# Patient Record
Sex: MALE | Race: WHITE | NOT HISPANIC OR LATINO | Employment: FULL TIME | URBAN - METROPOLITAN AREA
[De-identification: names, ages, dates, MRNs, and addresses within clinical notes are randomized per-mention and may not be internally consistent; named-entity substitution may affect disease eponyms.]

---

## 2017-06-20 ENCOUNTER — OFFICE VISIT (OUTPATIENT)
Dept: FAMILY MEDICINE | Facility: CLINIC | Age: 37
End: 2017-06-20
Payer: COMMERCIAL

## 2017-06-20 VITALS
SYSTOLIC BLOOD PRESSURE: 132 MMHG | TEMPERATURE: 98 F | DIASTOLIC BLOOD PRESSURE: 93 MMHG | HEIGHT: 72 IN | WEIGHT: 200.8 LBS | BODY MASS INDEX: 27.2 KG/M2

## 2017-06-20 DIAGNOSIS — M10.9 ACUTE GOUT OF LEFT ANKLE, UNSPECIFIED CAUSE: Primary | ICD-10-CM

## 2017-06-20 PROCEDURE — 99214 OFFICE O/P EST MOD 30 MIN: CPT | Performed by: FAMILY MEDICINE

## 2017-06-20 RX ORDER — INDOMETHACIN 25 MG/1
25 CAPSULE ORAL 2 TIMES DAILY WITH MEALS
Qty: 42 CAPSULE | Refills: 1 | Status: SHIPPED | OUTPATIENT
Start: 2017-06-20 | End: 2018-02-23

## 2017-06-20 NOTE — LETTER
[facility]  [address] [address]  [phone]        December 26, 2017    Tommie Lema  [address] [place] [address]              Dear Tommie Lema    This is to remind you that your fasting Lipid profile is due.    You may call our office at [phone] to schedule an appointment.    Please disregard this notice if you have already had your labs drawn or made an appointment.        Sincerely,        Eileen Etienne MD

## 2017-06-20 NOTE — PROGRESS NOTES
SUBJECTIVE:                                                    Tommie Lema is 37 year old male   Chief Complaint   Patient presents with     Arthritis     Gout  Duration: 5/15/17  Description   Location: Left ankle  Joint Swelling: YES  Redness: YES  Pain intensity:  7/10  Accompanying signs and symptoms: Radiation up to knee  History  Previous history of gout: YES   Trauma to the area: no   Precipitating factors:   Alcohol usage: socially  Diuretic use: no   Recent illness: no   Therapies tried and outcome: IBU. Colcrys no help        Problem list and histories reviewed & adjusted, as indicated.  Additional history: started in left [place]  Moved to [place].  Has had 1-2 attacks a year for several years.  Grandfather had gout.  Was drinking beer up north, thinks that was the cause of flare.    Patient Active Problem List   Diagnosis     FAMILY HISTORY OF CARDIOVASC DIS (ISCHEMIC)     CARDIOVASCULAR SCREENING; LDL GOAL LESS THAN 130     Gout     Past Surgical History:   Procedure Laterality Date     ABDOMEN SURGERY       GI SURGERY       SURGICAL HISTORY OF -       inguinal hernia - left     SURGICAL HISTORY OF -       wisdom teeth       Social History   Substance Use Topics     Smoking status: Never Smoker     Smokeless tobacco: Not on file     Alcohol use Yes      Comment: 1 drink/week     Family History   Problem Relation Age of Onset     Cardiovascular Father      C.A.D. Father      29     DIABETES Maternal Grandmother      DIABETES Maternal Grandfather      CEREBROVASCULAR DISEASE Paternal Grandfather      Asthma No family hx of      C.A.D. No family hx of      Hypertension No family hx of      Breast Cancer No family hx of      Cancer - colorectal No family hx of      Prostate Cancer No family hx of          Current Outpatient Prescriptions   Medication Sig Dispense Refill     indomethacin (INDOCIN) 25 MG capsule Take 1 capsule (25 mg) by mouth 2 times daily (with meals) 42  "capsule 1     COMPOUND (CMPD RX) - PHARMACY TO MIX COMPOUNDED MEDICATION G1 - DIC/BUP/COLC/IBU/INDO/XIMENA/TRIAM/PENT 5/1/0.05/3/2/1/0.1/3%Apply 1-2gm 2-3 times daily to affected area 120 g 1     ibuprofen (ADVIL,MOTRIN) 200 MG tablet Take 600 mg by mouth as needed. Indications: Mild to Moderate Pain       methylPREDNISolone (MEDROL DOSEPAK) 4 MG tablet Take 1 tablet (4 mg) by mouth See Admin Instructions (Patient not taking: Reported on 6/20/2017) 21 tablet 0     colchicine (COLCRYS) 0.6 MG tablet Take 2 tablets at the first sign of flare, take 1 additional tablet one hour later. (Patient not taking: Reported on 6/20/2017) 30 tablet 0     No Known Allergies  Recent Labs   Lab Test  03/11/16   1147  11/11/10   0425   LDL  Cannot estimate LDL when triglyceride exceeds 400 mg/dL   --    HDL  32*   --    TRIG  559*   --    CR   --   0.85   GFRESTIMATED   --   >90   GFRESTBLACK   --   >90   POTASSIUM   --   4.0      BP Readings from Last 3 Encounters:   06/20/17 (!) 132/93   08/26/16 (!) 130/92   03/11/16 148/90    Wt Readings from Last 3 Encounters:   06/20/17 200 lb 12.8 oz (91.1 kg)   08/26/16 206 lb (93.4 kg)   06/09/16 206 lb (93.4 kg)         ROS:  Constitutional, HEENT, cardiovascular, pulmonary, gi and gu systems are negative, except as otherwise noted.    OBJECTIVE:                                                    BP (!) 132/93  Temp 98  F (36.7  C) (Tympanic)  Ht 5' 11.5\" (1.816 m)  Wt 200 lb 12.8 oz (91.1 kg)  BMI 27.62 kg/m2  GENERAL APPEARANCE ADULT: Alert, no acute distress  MS: extremities normal, no peripheral edema  ankle exam: normal appearance, no swelling, tenderness at the lateral malleolus of left  SKIN: no suspicious lesions or rashes  NEURO: Alert, oriented, speech and mentation normal  PSYCH: mentation appears normal., affect and mood normal  Diagnostic Test Results:  none      ASSESSMENT/PLAN:                                                    1. Acute gout of left ankle, unspecified " cause  Dehydration probably ppt attack.  Discussed preventive treatment and acute treatment.  Will have indomethacin at home for next flare.  Will get lipids regulated and keep well hydrated.  - indomethacin (INDOCIN) 25 MG capsule; Take 1 capsule (25 mg) by mouth 2 times daily (with meals)  Dispense: 42 capsule; Refill: 1  - Lipid panel reflex to direct LDL; Future  - Basic metabolic panel; Future    Eileen Etienne MD  [facility]

## 2017-06-20 NOTE — MR AVS SNAPSHOT
After Visit Summary   6/20/2017    Tommie Lema    MRN: [id number]           Patient Information     Date Of Birth          1980        Visit Information        Provider Department      6/20/2017 6:40 AM Eileen Etienne MD [facility]        Today's Diagnoses     Acute gout of left ankle, unspecified cause    -  1      Care Instructions      Gout    Gout is an inflammation of a joint due to a build-up of gout crystals in the joint fluid. This occurs when there is an excess of uric acid (a normal waste product) in the body. Uric acid builds up in the body when the kidneys are unable to filter enough of it from the blood. This may occur with age. It is also associated with kidney disease. Gout occurs more often in persons with obesity, diabetes, hypertension, or high levels of fats in the blood. It may be run in families. Gout tends to come and go. A flare up of gout is called an attack. Drinking alcohol or eating certain foods (such as shellfish or foods with additives such as high-fructose corn syrup) may increase uric acid levels in the blood and cause a gout attack.  During a gout attack, the affected joint may become a hot, red, swollen and painful. If you have had one attack of gout, you are likely to have another. An attack of gout can be treated with medicine. If these attacks become frequent, a daily medicine may be prescribed to help the kidneys remove uric acid from the body.  Home care  During a gout attack:    Rest painful joints. If gout affects the joints of your foot or leg, you may want to use crutches for the first few days to keep from bearing weight on the affected joint.    When sitting or lying down, raise the painful joint to a level higher than your heart.    Apply an ice pack (ice cubes in a plastic bag wrapped in a thin towel) over the injured area for 20 minutes every 1-2 hours the first day for pain relief. Continue this 3-4 times a day for swelling  and pain.    Avoid alcohol and foods listed below (see Preventing attacks) during a gout attack. Drink extra fluid to help flush the uric acid through your kidneys.    If you were prescribed a medication to treat gout, take it as your healthcare provider has instructed. Don't skip doses.    Take anti-inflammatory medicine as directed.     If pain medicines have been prescribed, take them exactly as directed.    Preventing attacks    Minimize or avoid alcohol use. Excess alcohol intake can cause a gout attack.    Limit these foods and beverages:    Organ meats, such as kidneys and liver    Certain seafoods (anchovies, sardines, shrimp, scallops, herring, mackerel)    Wild game, meat extracts and meat gravies    Foods and beverages sweetened with high-fructose corn syrup, such as sodas    Eat a healthy diet including low-fat and nonfat dairy, whole grains, and vegetables.    If you are overweight, talk to your healthcare provider about a weight reduction plan. Avoid fasting or extreme low calorie diets (less than 900 calories per day). This will increase uric acid levels in the body.    If you have diabetes or high blood pressure, work with your doctor to manage these conditions.    Protect the joint from injury. Trauma can trigger a gout attack.  Follow-up care  Follow up with your healthcare provider or as advised.   When to seek medical advice  Call your healthcare provider if you have any of the following:    Fever over 100.4 F (38. C) with worsening joint pain    Increasing redness around the joint    Pain developing in another joint    Repeated vomiting, abdominal pain, or blood in the vomit or stool (black or red color)  Date Last Reviewed: 5/14/2015 2000-2017 [employer]. [address] [place] [address]. All rights reserved. This information is not intended as a substitute for professional medical care. Always follow your healthcare professional's instructions.        Treating Gout  Attacks     Raising the joint above the level of your heart can help reduce gout symptoms.     Gout is a disease that affects the joints. It is caused by excess uric acid in your blood stream that may lead to crystals forming in your joints. Left untreated, it can lead to painful foot and joint deformities and even kidney problems. But, by treating gout early, you can relieve pain and help prevent future problems. Gout can usually be treated with medication and proper diet. In severe cases, surgery may be needed.  Gout attacks are painful and often happen more than once. Taking medications may reduce pain and prevent attacks in the future. There are also some things you can do at home to relieve symptoms.  Medications for gout  Your healthcare provider may prescribe a daily medication to reduce levels of uric acid. Reducing your uric acid levels may help prevent gout attacks. Allopurinol is one commonly used medication taken daily to reduce uric acid levels. Other medications can help relieve pain and swelling during an acute attack. Medicines such as NSAIDs (nonsteroidal anti-inflammatory medicines), steroids, and colchicine may be prescribed for intermittent use to relieve an acute gout attack. Be sure to take your medication as directed.  What you can do  Below are some things you can do at home to relieve gout symptoms. Your healthcare provider may have other tips.    Rest the painful joint as much as you can.    Raise the painful joint so it is at a level higher than your heart.    Use ice for 10 minutes every 1-2 hours as possible.  How can I prevent gout?  With a little effort, you may be able to prevent gout attacks in the future. Here are some things you can do:    Avoid foods high in purines    Certain meats (red meat, processed meat, turkey)    Organ meats (kidney, liver, sweetbread)    Shellfish (lobster, crab, shrimp, scallop, mussel)    Certain fish (anchovy, sardine, herring, mackerel)    Take any  medications prescribed by your healthcare provider.    Lose weight if you need to.    Reduce high fructose corn syrup in meals and drinks.    Reduce or eliminate consumption of alcohol, particularly beer, but also red wine and spirits.    Control blood pressure, diabetes, and cholesterol.    Drink plenty of water to help flush uric acid from your body.  Date Last Reviewed: 2/1/2016 2000-2017 [employer]. [address] [place] [address], [place] [address]. All rights reserved. This information is not intended as a substitute for professional medical care. Always follow your healthcare professional's instructions.        Gout Diet  Gout is a painful condition caused by an excess of uric acid, a waste product made by the body. Uric acid forms crystals that collect in the joints. The immune response to these crystals brings on symptoms of joint pain and swelling. This is called a gout attack. Often, medications and diet changes are combined to manage gout. Below are some guidelines for changing your diet to help you manage gout and prevent attacks. Your health care provider will help you determine the best eating plan for you.     Eating to manage gout  Weight loss for those who are overweight may help reduce gout attacks.  Eat less of these foods  Eating too many foods containing purines may raise the levels of uric acid in your body. This raises your risk for a gout attack. Try to limit these foods and drinks:    Alcohol, such as beer and red wine. You may be told to avoid alcohol completely.    Soft drinks that contain sugar or high fructose corn syrup    Certain fish, including anchovies, sardines, fish eggs, and herring    Shellfish    Certain meats, such as red meat, hot dogs, luncheon meats, and turkey    Organ meats, such as liver, kidneys, and sweetbreads    Legumes, such as dried beans and peas    Other high fat foods such as gravy, whole milk, and high fat cheeses    Vegetables such as asparagus,  cauliflower, spinach, and mushrooms used to be thought to contribute to an increased risk for a gout attack, but recent studies show that high purine vegetables don't increase the risk for a gout attack.  Eat more of these foods  Other foods may be helpful for people with gout. Add some of these foods to your diet:    Cherries contain chemicals that may lower uric acid.    Omega fatty acids. These are found in some fatty fish such as salmon, certain oils (flax, olive, or nut), and nuts themselves. Omega fatty acids may help prevent inflammation due to gout.    Dairy products that are low-fat or fat-free, such as cheese and yogurt    Complex carbohydrate foods, including whole grains, brown rice, oats, and beans    Coffee, in moderation    Water, approximately 64 ounces per day  Follow-up care  Follow up with your healthcare provider as advised.  When to seek medical advice  Call your healthcare provider right away if any of these occur:    Return of gout symptoms, usually at night:    Severe pain, swelling, and heat in a joint, especially the base of the big toe    Affected joint is hard to move    Skin of the affected joint is purple or red    Fever of 100.4 F (38 C) or higher    Pain that doesn't get better even with prescribed medicine   Date Last Reviewed: 1/12/2016 2000-2017 [employer]. [address] [place] [address], [place] [address]. All rights reserved. This information is not intended as a substitute for professional medical care. Always follow your healthcare professional's instructions.        Eating to Prevent Gout  Gout is a painful form of arthritis caused by an excess of uric acid. This is a waste product made by the body. It builds up in the body and forms crystals that collect in the joints, bringing on a gout attack. Alcohol and certain foods can trigger a gout attack. Below are some guidelines for changing your diet to help you manage gout. Your healthcare provider can work with you to  determine the best eating plan for you. Know that diet is only one part of managing gout. Take your medicines as prescribed and follow the other guidelines your healthcare provider has given you.  Foods to limit  Eating too many foods containing purines may increase the levels of uric acid in your body and increase your risk for a gout attack. It may be best to limit these high-purine foods:    Alcohol (beer, red wine). You may be told to avoid alcohol completely.    Certain fish (anchovies, sardines, fish roes, herring, tuna, mussels, codfish, scallops, trout, and catrachito)    Certain meats (red meat, processed meat, priest, turkey, wild game, and goose)    Sauces and gravies made with meat    Organ meats (such as liver, kidneys, sweetbreads, and tripe)    Legumes (such as dried beans, peas)    Mushrooms, spinach, asparagus, and cauliflower    Yeast and yeast extract supplements  Foods to try  Some foods may be helpful for people with gout. You may want to try adding some of the following foods to your diet:    Dark berries: These include blueberries, blackberries, and cherries. These berries contain chemicals that may lower uric acid.    Tofu: Tofu, which is made from soy, is a good source of protein. Studies have shown that it may be a better choice than meat for people with gout.    Omega fatty acids: These acids are found in fatty fish (such as salmon), certain oils (such as flax, olive, or nut oils), or nuts. They may help prevent inflammation due to gout.  The following guidelines are recommended by the American Medical Association for people with gout. Your diet should be:    High in fiber, whole grains, fruits, and vegetables.    Low in protein (15% of calories should come from protein. Choose lean sources such as soy, lean meats, and poultry).    Low in fat (no more than 30% of calories should come from fat, with only 10% coming from animal fat).   Date Last Reviewed: 6/17/2015 2000-2017 [employer]  [employer]. [address] [place] [address]. All rights reserved. This information is not intended as a substitute for professional medical care. Always follow your healthcare professional's instructions.                Follow-ups after your visit        Future tests that were ordered for you today     Open Future Orders        Priority Expected Expires Ordered    Lipid panel reflex to direct LDL Routine 2017    Basic metabolic panel Routine 2017            Who to contact     If you have questions or need follow up information about today's clinic visit or your schedule please contact [facility] directly at [phone].  Normal or non-critical lab and imaging results will be communicated to you by Viroolhart, letter or phone within 4 business days after the clinic has received the results. If you do not hear from us within 7 days, please contact the clinic through Viroolhart or phone. If you have a critical or abnormal lab result, we will notify you by phone as soon as possible.  Submit refill requests through [employer] or call your pharmacy and they will forward the refill request to us. Please allow 3 business days for your refill to be completed.          Additional Information About Your Visit        Viroolhart Information     [employer] lets you send messages to your doctor, view your test results, renew your prescriptions, schedule appointments and more. To sign up, go to [email or web address] . Click on \"Log in\" on the left side of the screen, which will take you to the Welcome page. Then click on \"Sign up Now\" on the right side of the page.     You will be asked to enter the access code listed below, as well as some personal information. Please follow the directions to create your username and password.     Your access code is: [email or web address]  Expires: 2017  7:24 AM     Your access code will  in 90 days. If you need help or a new " "code, please call your [place] clinic or [phone].        Care EveryWhere ID     This is your Care EveryWhere ID. This could be used by other organizations to access your [place] medical records  [id number]        Your Vitals Were     Temperature Height BMI (Body Mass Index)             98  F (36.7  C) (Tympanic) 5' 11.5\" (1.816 m) 27.62 kg/m2          Blood Pressure from Last 3 Encounters:   06/20/17 (!) 132/93   08/26/16 (!) 130/92   03/11/16 148/90    Weight from Last 3 Encounters:   06/20/17 200 lb 12.8 oz (91.1 kg)   08/26/16 206 lb (93.4 kg)   06/09/16 206 lb (93.4 kg)                 Today's Medication Changes          These changes are accurate as of: 6/20/17  7:38 AM.  If you have any questions, ask your nurse or doctor.               Start taking these medicines.        Dose/Directions    indomethacin 25 MG capsule   Commonly known as:  INDOCIN   Used for:  Acute gout of left ankle, unspecified cause   Started by:  Eileen Etienne MD        Dose:  25 mg   Take 1 capsule (25 mg) by mouth 2 times daily (with meals)   Quantity:  42 capsule   Refills:  1            Where to get your medicines      These medications were sent to [place] [address] [place] [address]  [address] [facility] [address]     Phone:  [phone]     indomethacin 25 MG capsule                Primary Care Provider    Provider Unknown       No address on file        Thank you!     Thank you for choosing [facility]  for your care. Our goal is always to provide you with excellent care. Hearing back from our patients is one way we can continue to improve our services. Please take a few minutes to complete the written survey that you may receive in the mail after your visit with us. Thank you!             Your Updated Medication List - Protect others around you: Learn how to safely use, store and throw away your medicines at www.disposemymeds.org.          This list is " accurate as of: 6/20/17  7:38 AM.  Always use your most recent med list.                   Brand Name Dispense Instructions for use    colchicine 0.6 MG tablet    COLCRYS    30 tablet    Take 2 tablets at the first sign of flare, take 1 additional tablet one hour later.       COMPOUND - PHARMACY TO MIX COMPOUNDED MEDICATION    CMPD RX    120 g    G1 - DIC/BUP/COLC/IBU/INDO/XIMENA/TRIAM/PENT 5/1/0.05/3/2/1/0.1/3%Apply 1-2gm 2-3 times daily to affected area       ibuprofen 200 MG tablet    ADVIL/MOTRIN     Take 600 mg by mouth as needed. Indications: Mild to Moderate Pain       indomethacin 25 MG capsule    INDOCIN    42 capsule    Take 1 capsule (25 mg) by mouth 2 times daily (with meals)       methylPREDNISolone 4 MG tablet    MEDROL DOSEPAK    21 tablet    Take 1 tablet (4 mg) by mouth See Admin Instructions

## 2017-06-20 NOTE — NURSING NOTE
"Initial BP (!) 132/93  Temp 98  F (36.7  C) (Tympanic)  Ht 5' 11.5\" (1.816 m)  Wt 200 lb 12.8 oz (91.1 kg)  BMI 27.62 kg/m2 Estimated body mass index is 27.62 kg/(m^2) as calculated from the following:    Height as of this encounter: 5' 11.5\" (1.816 m).    Weight as of this encounter: 200 lb 12.8 oz (91.1 kg). .      "

## 2017-06-20 NOTE — PATIENT INSTRUCTIONS
Gout    Gout is an inflammation of a joint due to a build-up of gout crystals in the joint fluid. This occurs when there is an excess of uric acid (a normal waste product) in the body. Uric acid builds up in the body when the kidneys are unable to filter enough of it from the blood. This may occur with age. It is also associated with kidney disease. Gout occurs more often in persons with obesity, diabetes, hypertension, or high levels of fats in the blood. It may be run in families. Gout tends to come and go. A flare up of gout is called an attack. Drinking alcohol or eating certain foods (such as shellfish or foods with additives such as high-fructose corn syrup) may increase uric acid levels in the blood and cause a gout attack.  During a gout attack, the affected joint may become a hot, red, swollen and painful. If you have had one attack of gout, you are likely to have another. An attack of gout can be treated with medicine. If these attacks become frequent, a daily medicine may be prescribed to help the kidneys remove uric acid from the body.  Home care  During a gout attack:    Rest painful joints. If gout affects the joints of your foot or leg, you may want to use crutches for the first few days to keep from bearing weight on the affected joint.    When sitting or lying down, raise the painful joint to a level higher than your heart.    Apply an ice pack (ice cubes in a plastic bag wrapped in a thin towel) over the injured area for 20 minutes every 1-2 hours the first day for pain relief. Continue this 3-4 times a day for swelling and pain.    Avoid alcohol and foods listed below (see Preventing attacks) during a gout attack. Drink extra fluid to help flush the uric acid through your kidneys.    If you were prescribed a medication to treat gout, take it as your healthcare provider has instructed. Don't skip doses.    Take anti-inflammatory medicine as directed.     If pain medicines have been prescribed,  take them exactly as directed.    Preventing attacks    Minimize or avoid alcohol use. Excess alcohol intake can cause a gout attack.    Limit these foods and beverages:    Organ meats, such as kidneys and liver    Certain seafoods (anchovies, sardines, shrimp, scallops, herring, mackerel)    Wild game, meat extracts and meat gravies    Foods and beverages sweetened with high-fructose corn syrup, such as sodas    Eat a healthy diet including low-fat and nonfat dairy, whole grains, and vegetables.    If you are overweight, talk to your healthcare provider about a weight reduction plan. Avoid fasting or extreme low calorie diets (less than 900 calories per day). This will increase uric acid levels in the body.    If you have diabetes or high blood pressure, work with your doctor to manage these conditions.    Protect the joint from injury. Trauma can trigger a gout attack.  Follow-up care  Follow up with your healthcare provider or as advised.   When to seek medical advice  Call your healthcare provider if you have any of the following:    Fever over 100.4 F (38. C) with worsening joint pain    Increasing redness around the joint    Pain developing in another joint    Repeated vomiting, abdominal pain, or blood in the vomit or stool (black or red color)  Date Last Reviewed: 5/14/2015 2000-2017 [employer]. [address] [place] [address], [place] [address]. All rights reserved. This information is not intended as a substitute for professional medical care. Always follow your healthcare professional's instructions.        Treating Gout Attacks     Raising the joint above the level of your heart can help reduce gout symptoms.     Gout is a disease that affects the joints. It is caused by excess uric acid in your blood stream that may lead to crystals forming in your joints. Left untreated, it can lead to painful foot and joint deformities and even kidney problems. But, by treating gout early, you can relieve  pain and help prevent future problems. Gout can usually be treated with medication and proper diet. In severe cases, surgery may be needed.  Gout attacks are painful and often happen more than once. Taking medications may reduce pain and prevent attacks in the future. There are also some things you can do at home to relieve symptoms.  Medications for gout  Your healthcare provider may prescribe a daily medication to reduce levels of uric acid. Reducing your uric acid levels may help prevent gout attacks. Allopurinol is one commonly used medication taken daily to reduce uric acid levels. Other medications can help relieve pain and swelling during an acute attack. Medicines such as NSAIDs (nonsteroidal anti-inflammatory medicines), steroids, and colchicine may be prescribed for intermittent use to relieve an acute gout attack. Be sure to take your medication as directed.  What you can do  Below are some things you can do at home to relieve gout symptoms. Your healthcare provider may have other tips.    Rest the painful joint as much as you can.    Raise the painful joint so it is at a level higher than your heart.    Use ice for 10 minutes every 1-2 hours as possible.  How can I prevent gout?  With a little effort, you may be able to prevent gout attacks in the future. Here are some things you can do:    Avoid foods high in purines    Certain meats (red meat, processed meat, turkey)    Organ meats (kidney, liver, sweetbread)    Shellfish (lobster, crab, shrimp, scallop, mussel)    Certain fish (anchovy, sardine, herring, mackerel)    Take any medications prescribed by your healthcare provider.    Lose weight if you need to.    Reduce high fructose corn syrup in meals and drinks.    Reduce or eliminate consumption of alcohol, particularly beer, but also red wine and spirits.    Control blood pressure, diabetes, and cholesterol.    Drink plenty of water to help flush uric acid from your body.  Date Last Reviewed:  2/1/2016 2000-2017 [employer]. [address] [place] [address], [place] [address]. All rights reserved. This information is not intended as a substitute for professional medical care. Always follow your healthcare professional's instructions.        Gout Diet  Gout is a painful condition caused by an excess of uric acid, a waste product made by the body. Uric acid forms crystals that collect in the joints. The immune response to these crystals brings on symptoms of joint pain and swelling. This is called a gout attack. Often, medications and diet changes are combined to manage gout. Below are some guidelines for changing your diet to help you manage gout and prevent attacks. Your health care provider will help you determine the best eating plan for you.     Eating to manage gout  Weight loss for those who are overweight may help reduce gout attacks.  Eat less of these foods  Eating too many foods containing purines may raise the levels of uric acid in your body. This raises your risk for a gout attack. Try to limit these foods and drinks:    Alcohol, such as beer and red wine. You may be told to avoid alcohol completely.    Soft drinks that contain sugar or high fructose corn syrup    Certain fish, including anchovies, sardines, fish eggs, and herring    Shellfish    Certain meats, such as red meat, hot dogs, luncheon meats, and turkey    Organ meats, such as liver, kidneys, and sweetbreads    Legumes, such as dried beans and peas    Other high fat foods such as gravy, whole milk, and high fat cheeses    Vegetables such as asparagus, cauliflower, spinach, and mushrooms used to be thought to contribute to an increased risk for a gout attack, but recent studies show that high purine vegetables don't increase the risk for a gout attack.  Eat more of these foods  Other foods may be helpful for people with gout. Add some of these foods to your diet:    Cherries contain chemicals that may lower uric  acid.    Omega fatty acids. These are found in some fatty fish such as salmon, certain oils (flax, olive, or nut), and nuts themselves. Omega fatty acids may help prevent inflammation due to gout.    Dairy products that are low-fat or fat-free, such as cheese and yogurt    Complex carbohydrate foods, including whole grains, brown rice, oats, and beans    Coffee, in moderation    Water, approximately 64 ounces per day  Follow-up care  Follow up with your healthcare provider as advised.  When to seek medical advice  Call your healthcare provider right away if any of these occur:    Return of gout symptoms, usually at night:    Severe pain, swelling, and heat in a joint, especially the base of the big toe    Affected joint is hard to move    Skin of the affected joint is purple or red    Fever of 100.4 F (38 C) or higher    Pain that doesn't get better even with prescribed medicine   Date Last Reviewed: 1/12/2016 2000-2017 [employer]. [address] [place] [address]. All rights reserved. This information is not intended as a substitute for professional medical care. Always follow your healthcare professional's instructions.        Eating to Prevent Gout  Gout is a painful form of arthritis caused by an excess of uric acid. This is a waste product made by the body. It builds up in the body and forms crystals that collect in the joints, bringing on a gout attack. Alcohol and certain foods can trigger a gout attack. Below are some guidelines for changing your diet to help you manage gout. Your healthcare provider can work with you to determine the best eating plan for you. Know that diet is only one part of managing gout. Take your medicines as prescribed and follow the other guidelines your healthcare provider has given you.  Foods to limit  Eating too many foods containing purines may increase the levels of uric acid in your body and increase your risk for a gout attack. It may be best to  limit these high-purine foods:    Alcohol (beer, red wine). You may be told to avoid alcohol completely.    Certain fish (anchovies, sardines, fish roes, herring, tuna, mussels, codfish, scallops, trout, and catrachito)    Certain meats (red meat, processed meat, priest, turkey, wild game, and goose)    Sauces and gravies made with meat    Organ meats (such as liver, kidneys, sweetbreads, and tripe)    Legumes (such as dried beans, peas)    Mushrooms, spinach, asparagus, and cauliflower    Yeast and yeast extract supplements  Foods to try  Some foods may be helpful for people with gout. You may want to try adding some of the following foods to your diet:    Dark berries: These include blueberries, blackberries, and cherries. These berries contain chemicals that may lower uric acid.    Tofu: Tofu, which is made from soy, is a good source of protein. Studies have shown that it may be a better choice than meat for people with gout.    Omega fatty acids: These acids are found in fatty fish (such as salmon), certain oils (such as flax, olive, or nut oils), or nuts. They may help prevent inflammation due to gout.  The following guidelines are recommended by the American Medical Association for people with gout. Your diet should be:    High in fiber, whole grains, fruits, and vegetables.    Low in protein (15% of calories should come from protein. Choose lean sources such as soy, lean meats, and poultry).    Low in fat (no more than 30% of calories should come from fat, with only 10% coming from animal fat).   Date Last Reviewed: 6/17/2015 2000-2017 [employer]. [address] [place] [address], [place] [address]. All rights reserved. This information is not intended as a substitute for professional medical care. Always follow your healthcare professional's instructions.

## 2017-12-04 ENCOUNTER — OFFICE VISIT (OUTPATIENT)
Dept: FAMILY MEDICINE | Facility: CLINIC | Age: 37
End: 2017-12-04
Payer: COMMERCIAL

## 2017-12-04 VITALS
HEART RATE: 89 BPM | SYSTOLIC BLOOD PRESSURE: 130 MMHG | DIASTOLIC BLOOD PRESSURE: 87 MMHG | BODY MASS INDEX: 26.09 KG/M2 | WEIGHT: 192.6 LBS | HEIGHT: 72 IN | TEMPERATURE: 97.4 F

## 2017-12-04 DIAGNOSIS — Z30.9 ENCOUNTER FOR CONTRACEPTIVE MANAGEMENT, UNSPECIFIED TYPE: Primary | ICD-10-CM

## 2017-12-04 PROCEDURE — 99213 OFFICE O/P EST LOW 20 MIN: CPT | Performed by: FAMILY MEDICINE

## 2017-12-04 NOTE — PROGRESS NOTES
SUBJECTIVE:   Tommie Lema is a 37 year old male who presents to clinic today for the following health issues:    Patient is here today to discuss vasectomy      Current Outpatient Prescriptions:      indomethacin (INDOCIN) 25 MG capsule, Take 1 capsule (25 mg) by mouth 2 times daily (with meals) (Patient not taking: Reported on 12/4/2017), Disp: 42 capsule, Rfl: 1     methylPREDNISolone (MEDROL DOSEPAK) 4 MG tablet, Take 1 tablet (4 mg) by mouth See Admin Instructions (Patient not taking: Reported on 6/20/2017), Disp: 21 tablet, Rfl: 0     ibuprofen (ADVIL,MOTRIN) 200 MG tablet, Take 600 mg by mouth as needed. Indications: Mild to Moderate Pain, Disp: , Rfl:         SUBJECTIVE:  This 37 year old male is in for a vasectomy consultation.  He and his partner have decided they don't want to have any more children. They have decided that he will have the sterilization procedure instead of her.  Patient was given information to read prior to the consultation.      He has three healthy children, aged 11, 9 and 6. He has no allergies to Lidocaine or iodine. No bleeding disorders. He had a hernia repaired on the left side. This healed well.     We talked about the risks and benefits of the vasectomy; risks being that of potential for bleeding, infection, postoperative discomfort immediately which can last for a number of weeks afterwards, also the development of spermatoceles or sperm granulomas, epididymal cysts and the possibility of failure of the procedure producing failed attempt at sterility.     Also mentioned to the patient that there is some literature out there that suggests men who have vasectomies are at increased risk for prostate cancer; however, this literature is inconclusive and controversial.  It is recommended that men who have vasectomies should have annual prostate exams through the rectum yearly after age 40 and also may benefit from a screening prostatic specific antigen test after age 40.  We  "also discussed signs and symptoms of prostatic enlargement or prostatic problems.     I went through the procedure with the patient, how it is done, a midline incision in the scrotum measuring approximately 1/2 inch in length.  The vas deferens is brought up through this incision, dissected free and a small portion, maybe 0.5 cm. in length is removed.  Each end is tied with an absorbable suture, cauterized and then the proximal or distal end is buried away from the other.  Patient had no questions when it came to the procedure itself.  I did show him pictures and diagrams of the procedure.  I showed him where a potential spermatocele or sperm granuloma can occur.      Again, the patient had no further questions for me.    OBJECTIVE:Blood pressure 130/87, pulse 89, temperature 97.4  F (36.3  C), temperature source Tympanic, height 5' 11.5\" (1.816 m), weight 192 lb 9.6 oz (87.4 kg).      On exam, this is a well-developed, well-nourished white male.      Exam reveals a normal circumcised penis, no lesions.  Testes are descended bilaterally.  Exam was limited to the genitalia.  Both vas deferens were easily identified. Varicocele noted on the right.  No other abnormalities were noted.      ASSESSMENT:  Undesired fertility in an adult male, requesting vasectomy.    PLAN:  He will schedule a vasectomy at his convenience for either the last appointment of the morning or on a Thursday or Friday afternoon.  He is aware that he will need to take the entire weekend off and have essentially bedrest for two days with ice packs every two hours and ibuprofen for discomfort.  I gave him a prescription for ibuprofen 800 mg. to take every six to eight hours with food after the procedure.  He will take one tablet half an hour before the procedure along with Valium 10 mg.    If he has any further questions, he will contact me prior to the procedure or ask me on the day of the procedure.  He also is aware that he will need to bring a " specimen after 10-12 ejaculations to make sure that he has cleared the storage vesicles of any residual sperm and to make sure that he is sterile.       Steven Escobedo

## 2017-12-04 NOTE — NURSING NOTE
"Chief Complaint   Patient presents with     Consult     Vasectomy consult       Initial There were no vitals taken for this visit. Estimated body mass index is 27.62 kg/(m^2) as calculated from the following:    Height as of 6/20/17: 5' 11.5\" (1.816 m).    Weight as of 6/20/17: 200 lb 12.8 oz (91.1 kg).  Medication Reconciliation: complete   Netta Mayer CMA      "

## 2017-12-04 NOTE — MR AVS SNAPSHOT
"              After Visit Summary   12/4/2017    Tommie Lema    MRN: [id number]           Patient Information     Date Of Birth          1980        Visit Information        Provider Department      12/4/2017 1:40 PM Steven Escobedo MD [facility]        Today's Diagnoses     Encounter for contraceptive management, unspecified type    -  1       Follow-ups after your visit        Your next 10 appointments already scheduled     Dec 07, 2017  3:00 PM CST   [facility] with [facility]edo [facility]    [address] [facility] [id number] [phone]           Bring a current list of meds and any records pertaining to this visit. For Physicals, please bring immunization records and any forms needing to be filled out. Please arrive 10 minutes early to complete paperwork.              Who to contact     If you have questions or need follow up information about today's clinic visit or your schedule please contact [facility] directly at [phone].  Normal or non-critical lab and imaging results will be communicated to you by [employer], letter or phone within 4 business days after the clinic has received the results. If you do not hear from us within 7 days, please contact the clinic through [employer] or phone. If you have a critical or abnormal lab result, we will notify you by phone as soon as possible.  Submit refill requests through [employer] or call your pharmacy and they will forward the refill request to us. Please allow 3 business days for your refill to be completed.          Additional Information About Your Visit        [employer] Information     [employer] lets you send messages to your doctor, view your test results, renew your prescriptions, schedule appointments and more. To sign up, go to [email or web address] . Click on \"Log in\" on the left side of the screen, which will take you to the Welcome page. Then " "click on \"Sign up Now\" on the right side of the page.     You will be asked to enter the access code listed below, as well as some personal information. Please follow the directions to create your username and password.     Your access code is: [email or web address]  Expires: 3/4/2018  2:24 PM     Your access code will  in 90 days. If you need help or a new code, please call your [place] clinic or [phone].        Care EveryWhere ID     This is your Care EveryWhere ID. This could be used by other organizations to access your [place] medical records  [id number]        Your Vitals Were     Pulse Temperature Height BMI (Body Mass Index)          89 97.4  F (36.3  C) (Tympanic) 5' 11.5\" (1.816 m) 26.49 kg/m2         Blood Pressure from Last 3 Encounters:   17 130/87   17 (!) 132/93   16 (!) 130/92    Weight from Last 3 Encounters:   17 192 lb 9.6 oz (87.4 kg)   17 200 lb 12.8 oz (91.1 kg)   16 206 lb (93.4 kg)              Today, you had the following     No orders found for display         Today's Medication Changes          These changes are accurate as of: 17  2:24 PM.  If you have any questions, ask your nurse or doctor.               Stop taking these medicines if you haven't already. Please contact your care team if you have questions.     colchicine 0.6 MG tablet   Commonly known as:  COLCRYS   Stopped by:  Steven Escobedo MD           COMPOUNDED NON-CONTROLLED SUBSTANCE - PHARMACY TO MIX COMPOUNDED MEDICATION   Commonly known as:  CMPD RX   Stopped by:  Steven Escobedo MD                    Primary Care Provider Office Phone # Fax #    Paddy Salcido [phone] [address] [facility] [address]        Equal Access to Services     PEDRO GARZA AH: Viola Lambert, karely luqadaha, qaybta kaalsunil hammonds. [facility] [phone].    ATENCIÓN: Si shadi malagon a joaquin " disposición servicios gratuitos de asistencia lingüística. Dhruv bhandari [phone].    We comply with applicable federal civil rights laws and [place] laws. We do not discriminate on the basis of race, color, national origin, age, disability, sex, sexual orientation, or gender identity.            Thank you!     Thank you for choosing [facility]  for your care. Our goal is always to provide you with excellent care. Hearing back from our patients is one way we can continue to improve our services. Please take a few minutes to complete the written survey that you may receive in the mail after your visit with us. Thank you!             Your Updated Medication List - Protect others around you: Learn how to safely use, store and throw away your medicines at www.disposemymeds.org.          This list is accurate as of: 12/4/17  2:24 PM.  Always use your most recent med list.                   Brand Name Dispense Instructions for use Diagnosis    ibuprofen 200 MG tablet    ADVIL/MOTRIN     Take 600 mg by mouth as needed. Indications: Mild to Moderate Pain        indomethacin 25 MG capsule    INDOCIN    42 capsule    Take 1 capsule (25 mg) by mouth 2 times daily (with meals)    Acute gout of left ankle, unspecified cause       methylPREDNISolone 4 MG tablet    MEDROL DOSEPAK    21 tablet    Take 1 tablet (4 mg) by mouth See Admin Instructions    Bilateral foot pain, Acute idiopathic gout of right foot

## 2017-12-07 ENCOUNTER — OFFICE VISIT (OUTPATIENT)
Dept: FAMILY MEDICINE | Facility: CLINIC | Age: 37
End: 2017-12-07
Payer: COMMERCIAL

## 2017-12-07 VITALS
HEART RATE: 84 BPM | BODY MASS INDEX: 25.73 KG/M2 | TEMPERATURE: 98.1 F | DIASTOLIC BLOOD PRESSURE: 85 MMHG | WEIGHT: 190 LBS | HEIGHT: 72 IN | SYSTOLIC BLOOD PRESSURE: 139 MMHG

## 2017-12-07 DIAGNOSIS — Z30.2 ENCOUNTER FOR STERILIZATION: Primary | ICD-10-CM

## 2017-12-07 PROCEDURE — 55250 REMOVAL OF SPERM DUCT(S): CPT | Performed by: FAMILY MEDICINE

## 2017-12-07 PROCEDURE — 88302 TISSUE EXAM BY PATHOLOGIST: CPT | Performed by: FAMILY MEDICINE

## 2017-12-07 NOTE — PROGRESS NOTES
SUBJECTIVE:   Tommie Lema is a 37 year old male who presents to clinic today for the following health issues:      VASECTOMY:  Here for the procedure Vasectomy.  Consult was done on 12-4-17.    SUBJECTIVE:  Informed consent was obtained.  An opportunity for questions was given, these were answered to his satisfaction.  Vasectomy literature was reviewed at his consult and post vas instruction sheets have also been reviewed.  He knows that it is meant to be a permanent procedure and that he needs to bring in a post vas specimen in eight to twelve weeks after twelve to fifteen ejaculations and have a negative semen analysis with no sperm seen before he can be considered sterile.  He also knows that he has to bring in a sample in one year. He wishes to proceed.  He did shave the night before.     SEDATION:  none    PROCEDURE:  The penis was taped up on to the abdomen.  The shave was adequate. The scrotum was prepped with Betadine and draped in a sterile fashion.  Attention was turned to the right vas; this was palpated and the area infiltrated with 1% Lidocaine plain.  A 1 cm. incision was made in the skin and blunt dissection carried out through the subcutaneous tissue.  The vas was grasped with the vas clamp and brought to the surface.  The monique vas tissue was then dissected free.  The vas was then doubly clamped and a 3 mm. section excised.  This was sent to Pathology for confirmation.  The ends were then cauterized and the proximal end buried with a pursestring suture using 4-0 Vicryl.  Any bleeders were controlled with the pursestring suture and/or cautery. The ends were inspected and hemostasis was deemed to be adequate.  The two ends were then delivered back into the scrotum and the wound was dressed.  Attention was then turned to the left vas and a similar procedure was carried out.    Specimens sent of the right and left vas.     COMPLICATIONS:  none    PLAN:  He is going to take it easy over the next  couple of days.  He will follow the instructions on his post vas instruction sheet.    MEDICATIONS:  none    MD Steven PATHAK

## 2017-12-07 NOTE — NURSING NOTE
"Chief Complaint   Patient presents with     Vasectomy     Here for the procedure Vasectomy.  Consult was done on 12-4-17.       Initial /85  Pulse 84  Temp 98.1  F (36.7  C) (Tympanic)  Ht 5' 11.5\" (1.816 m)  Wt 190 lb (86.2 kg)  BMI 26.13 kg/m2 Estimated body mass index is 26.13 kg/(m^2) as calculated from the following:    Height as of this encounter: 5' 11.5\" (1.816 m).    Weight as of this encounter: 190 lb (86.2 kg).  Medication Reconciliation: complete  "

## 2017-12-07 NOTE — LETTER
"December 11, 2017      Luis Manuel Palafox  [address] [place] [address]        Dear ,    We are writing to inform you of your test results.    Normal pathology results.    Resulted Orders   Surgical pathology exam   Result Value Ref Range    Copath Report       Patient Name: LUIS MANUEL PALAFOX  MR#: [id number]  Specimen #: [id number]  Collected: 12/7/2017  Received: 12/8/2017  Reported: 12/11/2017 10:22  Ordering Phy(s): RUBEN ESCOBEDO    For improved result formatting, select 'View Enhanced Report Format'  under Linked Documents section.    SPECIMEN(S):  Vas deferens, right and left    FINAL DIAGNOSIS:  Vas deferens, bilateral vasectomy:  - Bilateral vasa differentia with no pathologic changes  - Complete cross sections visualized bilaterally.    Electronically signed out by:    Chacorta Huerta M.D., PhD    CLINICAL HISTORY:  37 year old male.    GROSS:  The specimen is received in formalin, labeled with the patient's name  and date of birth, and designated \"right left vas deferens\". It consists  of two cylindrical fragments of tan soft tissue, measuring 0.8 cm in  length x 0.3 cm in diameter and 0.5 cm in length x 0.3 cm in diameter.  The larger specimen is inked blue as an identification marker and to  assist in orientation.  Entirely sub mitted in one cassette. (Dictated  by: Awilda HANNAH 12/8/2017 09:29 AM)    MICROSCOPIC:  Microscopic examination is performed.    CPT Codes:  A: 21921-DY0    TESTING LAB LOCATION:  [address] [address] [phone]    COLLECTION SITE:  Client: [facility]  Location: [facility] ()         If you have any questions or concerns, please call the clinic at the number listed above.       Sincerely,        Ruben Escobedo MD                "

## 2017-12-07 NOTE — PATIENT INSTRUCTIONS
Thank you for choosing [facility].  You may be receiving a survey in the mail from [employer] regarding your visit today.  Please take a few minutes to complete and return the survey to let us know how we are doing.      If you have questions or concerns, please contact us via "[employer] or you can contact your care team at [phone].    Our Clinic hours are:  Monday 6:40 am  to 7:00 pm  Tuesday -Friday 6:40 am to 5:00 pm    The [place] outpatient lab hours are:  Monday - Friday 6:10 am to 4:45 pm  Saturdays 7:00 am to 11:00 am  Appointments are required, call [phone]    If you have clinical questions after hours or would like to schedule an appointment,  call the clinic at [phone].

## 2017-12-11 LAB — COPATH REPORT: NORMAL

## 2018-02-14 ENCOUNTER — TELEPHONE (OUTPATIENT)
Dept: FAMILY MEDICINE | Facility: CLINIC | Age: 38
End: 2018-02-14

## 2018-02-14 NOTE — TELEPHONE ENCOUNTER
We sent this patient an overdue lab letter on 12/26/17. If they need these results lease contact the patient.  Thank you  Op lab

## 2018-02-14 NOTE — TELEPHONE ENCOUNTER
Left message for the patient to call the clinic. Over due lab note for lipid and BMP.  Does not take any medication for lipids but does take indocin for gout and will need the BMP for refill.  Dot WRIGHT RN

## 2018-02-14 NOTE — TELEPHONE ENCOUNTER
Patient notified and scheduled lab appointment for 2-16.    Car[facility] [place]   Clinic Station

## 2018-02-16 ENCOUNTER — APPOINTMENT (OUTPATIENT)
Dept: LAB | Facility: CLINIC | Age: 38
End: 2018-02-16
Payer: COMMERCIAL

## 2018-02-16 DIAGNOSIS — Z30.2 ENCOUNTER FOR STERILIZATION: ICD-10-CM

## 2018-02-16 DIAGNOSIS — M10.9 ACUTE GOUT OF LEFT ANKLE, UNSPECIFIED CAUSE: ICD-10-CM

## 2018-02-16 LAB
ANION GAP SERPL CALCULATED.3IONS-SCNC: 5 MMOL/L (ref 3–14)
BUN SERPL-MCNC: 14 MG/DL (ref 7–30)
CALCIUM SERPL-MCNC: 8.5 MG/DL (ref 8.5–10.1)
CHLORIDE SERPL-SCNC: 100 MMOL/L (ref 94–109)
CHOLEST SERPL-MCNC: 266 MG/DL
CO2 SERPL-SCNC: 31 MMOL/L (ref 20–32)
CREAT SERPL-MCNC: 0.76 MG/DL (ref 0.66–1.25)
GFR SERPL CREATININE-BSD FRML MDRD: >90 ML/MIN/1.7M2
GLUCOSE SERPL-MCNC: 101 MG/DL (ref 70–99)
HDLC SERPL-MCNC: 26 MG/DL
LDLC SERPL CALC-MCNC: ABNORMAL MG/DL
LDLC SERPL DIRECT ASSAY-MCNC: 77 MG/DL
NONHDLC SERPL-MCNC: 240 MG/DL
POTASSIUM SERPL-SCNC: 4.1 MMOL/L (ref 3.4–5.3)
SEMEN ANALYSIS P VAS PNL: NORMAL
SODIUM SERPL-SCNC: 136 MMOL/L (ref 133–144)
TRIGL SERPL-MCNC: 1490 MG/DL

## 2018-02-16 PROCEDURE — 80048 BASIC METABOLIC PNL TOTAL CA: CPT | Performed by: FAMILY MEDICINE

## 2018-02-16 PROCEDURE — 36415 COLL VENOUS BLD VENIPUNCTURE: CPT | Performed by: FAMILY MEDICINE

## 2018-02-16 PROCEDURE — 80061 LIPID PANEL: CPT | Performed by: FAMILY MEDICINE

## 2018-02-16 PROCEDURE — 83721 ASSAY OF BLOOD LIPOPROTEIN: CPT | Mod: 59 | Performed by: FAMILY MEDICINE

## 2018-02-16 PROCEDURE — 89321 SEMEN ANAL SPERM DETECTION: CPT | Performed by: FAMILY MEDICINE

## 2018-02-16 NOTE — PROGRESS NOTES
Triglycerides and glucose elevated, not normal. Leaning towards diabetes.  See provider for treatment.  Please notify.        Thank you. ANA RIVAS MD

## 2018-02-23 ENCOUNTER — OFFICE VISIT (OUTPATIENT)
Dept: FAMILY MEDICINE | Facility: CLINIC | Age: 38
End: 2018-02-23
Payer: COMMERCIAL

## 2018-02-23 VITALS
SYSTOLIC BLOOD PRESSURE: 128 MMHG | RESPIRATION RATE: 12 BRPM | BODY MASS INDEX: 27.7 KG/M2 | TEMPERATURE: 97.8 F | WEIGHT: 201.4 LBS | DIASTOLIC BLOOD PRESSURE: 88 MMHG | HEART RATE: 91 BPM

## 2018-02-23 DIAGNOSIS — R73.01 ELEVATED FASTING BLOOD SUGAR: ICD-10-CM

## 2018-02-23 DIAGNOSIS — E78.1 HIGH BLOOD TRIGLYCERIDES: Primary | ICD-10-CM

## 2018-02-23 DIAGNOSIS — M10.9 ACUTE GOUT OF LEFT ANKLE, UNSPECIFIED CAUSE: ICD-10-CM

## 2018-02-23 PROCEDURE — 99214 OFFICE O/P EST MOD 30 MIN: CPT | Mod: 24 | Performed by: FAMILY MEDICINE

## 2018-02-23 RX ORDER — INDOMETHACIN 25 MG/1
25 CAPSULE ORAL 2 TIMES DAILY WITH MEALS
Qty: 42 CAPSULE | Refills: 3 | Status: SHIPPED | OUTPATIENT
Start: 2018-02-23 | End: 2020-01-10 | Stop reason: ALTCHOICE

## 2018-02-23 RX ORDER — FENOFIBRATE 145 MG/1
145 TABLET, COATED ORAL DAILY
Qty: 90 TABLET | Refills: 3 | Status: CANCELLED | OUTPATIENT
Start: 2018-02-23

## 2018-02-23 ASSESSMENT — PAIN SCALES - GENERAL: PAINLEVEL: NO PAIN (0)

## 2018-02-23 NOTE — PROGRESS NOTES
SUBJECTIVE:   Tommie Lema is a 37 year old male who presents to clinic today for the following health issues:      Chief Complaint   Patient presents with     Lab results     Here to discuss lab results drawn 2/16/18.       Family history of high cholesterol and triglycerides.  Patient does train for marathons    Has never had pancreatitis    Problem list and histories reviewed & adjusted, as indicated.  Additional history: as documented    BP Readings from Last 3 Encounters:   02/23/18 (!) 148/93   12/07/17 139/85   12/04/17 130/87    Wt Readings from Last 3 Encounters:   02/23/18 201 lb 6.4 oz (91.4 kg)   12/07/17 190 lb (86.2 kg)   12/04/17 192 lb 9.6 oz (87.4 kg)                  Labs reviewed in EPIC    Reviewed and updated as needed this visit by clinical staff  Tobacco  Allergies  Meds  Med Hx  Surg Hx  Fam Hx  Soc Hx      Reviewed and updated as needed this visit by Provider         ROS:  Constitutional, HEENT, cardiovascular, pulmonary, gi and gu systems are negative, except as otherwise noted.    OBJECTIVE:     BP (!) 148/93  Pulse 91  Temp 97.8  F (36.6  C) (Tympanic)  Resp 12  Wt 201 lb 6.4 oz (91.4 kg)  BMI 27.7 kg/m2  Body mass index is 27.7 kg/(m^2).  GENERAL: healthy, alert and no distress  PSYCH: mentation appears normal, affect normal/bright    Diagnostic Test Results:  none     ASSESSMENT/PLAN:       Tommie was seen today for lab results.    Diagnoses and all orders for this visit:    High blood triglycerides: over 1000, no History of pancreatitis, discussed treatment for triglycerides including fenofibrate  -patient had been on vacation the week before and was not eating healthy, so wants to try lifestyle changes first prior to medication.  -plan lifestyle changes then recheck labs in 3 months, if trg over 1000 at that point plan to start fenofibrate to prevent pancreatitis  -educated on pancreatitis  -     Lipid panel reflex to direct LDL Fasting; Future    Acute gout of left  ankle, unspecified cause: none currently but would like refill to take at first sign of flare  -only flares if drinks beer or eats wrong foods  -plan to check uric acid, goal under 6 and if not consider adding amlodipine.  -     indomethacin (INDOCIN) 25 MG capsule; Take 1 capsule (25 mg) by mouth 2 times daily (with meals)  -     **Uric acid FUTURE 2mo; Future    Elevated fasting blood sugar  -     **A1C FUTURE 1yr; Future      Patient Instructions   In 3 months schedule lab only fasting labs for cholesterol, blood sugar, uric acid. If triglycerides are very high again consider starting Fenofibrate.    There are medications every day to prevent gout if needed. Allopurinol.  Understanding Food and Cholesterol  Having a high cholesterol level puts you at risk for heart disease and other health problems. What you eat has a big effect on your body s cholesterol level. Eating certain foods can raise your cholesterol. Other foods can help you lower it. Watching what you eat can help you get your cholesterol level under control.  Know which foods are high in saturated fat and trans fat  Foods high in saturated fat and trans fat can raise your LDL (bad) cholesterol. It s important to knowing which foods are high in these fats, and eat less of them. This can help you manage your cholesterol levels.  Foods high in these fats are:    Animal products, including beef, lamb, pork, and poultry with skin on    Cold cuts, priest, sausage    Creamy sauces and fatty gravies    Cookies, donuts, muffins, and pastries    Fried foods    Shortening, butter, coconut oil, palm oil, cocoa butter, partially hydrogenated oils (read labels)    High-fat dairy products such as whole milk, cheese, cream cheese, and ice cream  Better choices:    Lean beef, skinless white-meat poultry, fish    Tomato sauce, vegetable puree    Dried fruit, bagels, bread with jam    Baked, broiled, steamed, or roasted foods    Soft (tub) margarine, canola oil, and  olive oil in moderate amounts    Low-fat or nonfat dairy products, such as 1% or fat-free milk, and reduced-fat cheese  Use fiber to help control cholesterol  Foods high in fiber can help you keep your cholesterol down. Good sources of fiber are:    Oats    Barley    Whole grains    Beans    Vegetables    Cornmeal    Popcorn    Berries, apples, other fruits  Date Last Reviewed: 8/10/2015    8961-4165 [employer]. [address] [place] [address], [place] [id number]. All rights reserved. This information is not intended as a substitute for professional medical care. Always follow your healthcare professional's instructions.            Paddy Salcido MD  [facility]

## 2018-02-23 NOTE — PATIENT INSTRUCTIONS
In 3 months schedule lab only fasting labs for cholesterol, blood sugar, uric acid. If triglycerides are very high again consider starting Fenofibrate.    There are medications every day to prevent gout if needed. Allopurinol.  Understanding Food and Cholesterol  Having a high cholesterol level puts you at risk for heart disease and other health problems. What you eat has a big effect on your body s cholesterol level. Eating certain foods can raise your cholesterol. Other foods can help you lower it. Watching what you eat can help you get your cholesterol level under control.  Know which foods are high in saturated fat and trans fat  Foods high in saturated fat and trans fat can raise your LDL (bad) cholesterol. It s important to knowing which foods are high in these fats, and eat less of them. This can help you manage your cholesterol levels.  Foods high in these fats are:    Animal products, including beef, lamb, pork, and poultry with skin on    Cold cuts, priest, sausage    Creamy sauces and fatty gravies    Cookies, donuts, muffins, and pastries    Fried foods    Shortening, butter, coconut oil, palm oil, cocoa butter, partially hydrogenated oils (read labels)    High-fat dairy products such as whole milk, cheese, cream cheese, and ice cream  Better choices:    Lean beef, skinless white-meat poultry, fish    Tomato sauce, vegetable puree    Dried fruit, bagels, bread with jam    Baked, broiled, steamed, or roasted foods    Soft (tub) margarine, canola oil, and olive oil in moderate amounts    Low-fat or nonfat dairy products, such as 1% or fat-free milk, and reduced-fat cheese  Use fiber to help control cholesterol  Foods high in fiber can help you keep your cholesterol down. Good sources of fiber are:    Oats    Barley    Whole grains    Beans    Vegetables    Cornmeal    Popcorn    Berries, apples, other fruits  Date Last Reviewed: 8/10/2015    7212-6822 [employer]. [address] [facility],  [place] [address]. All rights reserved. This information is not intended as a substitute for professional medical care. Always follow your healthcare professional's instructions.

## 2018-02-23 NOTE — NURSING NOTE
"Initial /88  Pulse 91  Temp 97.8  F (36.6  C) (Tympanic)  Resp 12  Wt 201 lb 6.4 oz (91.4 kg)  BMI 27.7 kg/m2 Estimated body mass index is 27.7 kg/(m^2) as calculated from the following:    Height as of 12/7/17: 5' 11.5\" (1.816 m).    Weight as of this encounter: 201 lb 6.4 oz (91.4 kg). .      "

## 2018-02-23 NOTE — MR AVS SNAPSHOT
After Visit Summary   2/23/2018    Tommie Lema    MRN: [id number]           Patient Information     Date Of Birth          1980        Visit Information        Provider Department      2/23/2018 1:20 PM Paddy Salcido MD [facility]        Today's Diagnoses     High blood triglycerides    -  1    Acute gout of left ankle, unspecified cause        Elevated fasting blood sugar          Care Instructions    In 3 months schedule lab only fasting labs for cholesterol, blood sugar, uric acid. If triglycerides are very high again consider starting Fenofibrate.    There are medications every day to prevent gout if needed. Allopurinol.  Understanding Food and Cholesterol  Having a high cholesterol level puts you at risk for heart disease and other health problems. What you eat has a big effect on your body s cholesterol level. Eating certain foods can raise your cholesterol. Other foods can help you lower it. Watching what you eat can help you get your cholesterol level under control.  Know which foods are high in saturated fat and trans fat  Foods high in saturated fat and trans fat can raise your LDL (bad) cholesterol. It s important to knowing which foods are high in these fats, and eat less of them. This can help you manage your cholesterol levels.  Foods high in these fats are:    Animal products, including beef, lamb, pork, and poultry with skin on    Cold cuts, priest, sausage    Creamy sauces and fatty gravies    Cookies, donuts, muffins, and pastries    Fried foods    Shortening, butter, coconut oil, palm oil, cocoa butter, partially hydrogenated oils (read labels)    High-fat dairy products such as whole milk, cheese, cream cheese, and ice cream  Better choices:    Lean beef, skinless white-meat poultry, fish    Tomato sauce, vegetable puree    Dried fruit, bagels, bread with jam    Baked, broiled, steamed, or roasted foods    Soft (tub) margarine, canola oil, and olive  oil in moderate amounts    Low-fat or nonfat dairy products, such as 1% or fat-free milk, and reduced-fat cheese  Use fiber to help control cholesterol  Foods high in fiber can help you keep your cholesterol down. Good sources of fiber are:    Oats    Barley    Whole grains    Beans    Vegetables    Cornmeal    Popcorn    Berries, apples, other fruits  Date Last Reviewed: 8/10/2015    6776-8097 [employer]. [address] [place] [address], [place] [address]. All rights reserved. This information is not intended as a substitute for professional medical care. Always follow your healthcare professional's instructions.                Follow-ups after your visit        Future tests that were ordered for you today     Open Future Orders        Priority Expected Expires Ordered    **A1C FUTURE 1yr Routine 1/24/2019 2/23/2019 2/23/2018    Lipid panel reflex to direct LDL Fasting Routine 5/25/2018 8/24/2018 2/23/2018    **Uric acid FUTURE 2mo Routine 5/24/2018 6/23/2018 2/23/2018            Who to contact     If you have questions or need follow up information about today's clinic visit or your schedule please contact [facility] directly at [phone].  Normal or non-critical lab and imaging results will be communicated to you by MyChart, letter or phone within 4 business days after the clinic has received the results. If you do not hear from us within 7 days, please contact the clinic through [employer] or phone. If you have a critical or abnormal lab result, we will notify you by phone as soon as possible.  Submit refill requests through [employer] or call your pharmacy and they will forward the refill request to us. Please allow 3 business days for your refill to be completed.          Additional Information About Your Visit        [employer] Information     [employer] lets you send messages to your doctor, view your test results, renew your prescriptions, schedule appointments and more. To sign up, go to  [email or web address] . Click on \"Log in\" on the left side of the screen, which will take you to the Welcome page. Then click on \"Sign up Now\" on the right side of the page.     You will be asked to enter the access code listed below, as well as some personal information. Please follow the directions to create your username and password.     Your access code is: [email or web address]  Expires: 3/4/2018  2:24 PM     Your access code will  in 90 days. If you need help or a new code, please call your [facility] clinic or [phone].        Care EveryWhere ID     This is your Care EveryWhere ID. This could be used by other organizations to access your [facility] medical records  [id number]        Your Vitals Were     Pulse Temperature Respirations BMI (Body Mass Index)          91 97.8  F (36.6  C) (Tympanic) 12 27.7 kg/m2         Blood Pressure from Last 3 Encounters:   18 (!) 148/93   17 139/85   17 130/87    Weight from Last 3 Encounters:   18 201 lb 6.4 oz (91.4 kg)   17 190 lb (86.2 kg)   17 192 lb 9.6 oz (87.4 kg)                 Where to get your medicines      These medications were sent to [facility] [address]  [address] [facility] [address]     Phone:  [phone]     indomethacin 25 MG capsule          Primary Care Provider Office Phone # Fax #    Paddy Salcido [phone]       [address] [facility] [address]        Equal Access to Services     PEDRO GARZA AH: Hadii linh Lambert, karely luorestes, starla kaalmada sunil evans. So [facility] [phone].    ATENCIÓN: Si habla español, tiene a joaquin disposición servicios gratuitos de asistencia lingüística. Llame al [phone].    We comply with applicable federal civil rights laws and [place] laws. We do not discriminate on the basis of race, color, national origin, age, disability, sex, sexual orientation, " or gender identity.            Thank you!     Thank you for choosing [facility]  for your care. Our goal is always to provide you with excellent care. Hearing back from our patients is one way we can continue to improve our services. Please take a few minutes to complete the written survey that you may receive in the mail after your visit with us. Thank you!             Your Updated Medication List - Protect others around you: Learn how to safely use, store and throw away your medicines at www.disposemymeds.org.          This list is accurate as of 2/23/18  1:42 PM.  Always use your most recent med list.                   Brand Name Dispense Instructions for use Diagnosis    ibuprofen 200 MG tablet    ADVIL/MOTRIN     Take 600 mg by mouth as needed. Indications: Mild to Moderate Pain        indomethacin 25 MG capsule    INDOCIN    42 capsule    Take 1 capsule (25 mg) by mouth 2 times daily (with meals)    Acute gout of left ankle, unspecified cause

## 2018-02-26 PROBLEM — R73.01 ELEVATED FASTING BLOOD SUGAR: Status: ACTIVE | Noted: 2018-02-26

## 2018-02-26 PROBLEM — E78.1 HIGH BLOOD TRIGLYCERIDES: Status: ACTIVE | Noted: 2018-02-26

## 2018-09-05 ENCOUNTER — TELEPHONE (OUTPATIENT)
Dept: FAMILY MEDICINE | Facility: CLINIC | Age: 38
End: 2018-09-05

## 2018-09-05 DIAGNOSIS — M10.9 ACUTE GOUT OF LEFT ANKLE, UNSPECIFIED CAUSE: Primary | ICD-10-CM

## 2018-09-05 RX ORDER — COLCHICINE 0.6 MG/1
TABLET ORAL
Qty: 30 TABLET | Refills: 0 | Status: SHIPPED | OUTPATIENT
Start: 2018-09-05 | End: 2018-11-06

## 2018-09-05 NOTE — TELEPHONE ENCOUNTER
Reason for Call:  Other prescription    Detailed comments: for gout, Dr. Etienne recommended a daily pill if current regimen is not working. Its been working ok but has been getting more Flar ups    Phone Number Patient can be reached at: Home number on file [phone] (home)    Best Time: any    Can we leave a detailed message on this number? YES    Call taken on 9/5/2018 at 3:26 PM by Winter Pascual

## 2018-09-05 NOTE — TELEPHONE ENCOUNTER
Covering for PCP:    Signed Colchicine prescription for treatment of gout flares, recommend follow up if no improvement in 2-3 days. Colchicine is not ideal medication for long term gout prophylactic treatment since it does not prevent the development of silent bony erosions due to gout.   We usually recommend Allopurinol, he can make office visit to discuss this, I usually recommend checking uric acid level, liver and renal function before initiation of Allopurinol and then same labs 2 months after. I think it will be more appropriate to schedule office visit so this can be discussed more.       STEF Padilla CNP

## 2018-09-05 NOTE — TELEPHONE ENCOUNTER
Getting gout flare ups 2 times per month. Taking indomethacin- only works post flare up. Patient states Dr. Etienne recommended Colchicine but patient was hesitant to start daily medication. However, he would like to start this if appropriate. I did notify patient he should discuss in clinic-Patient would like request routed to provider for consideration.     Samara MURCIA RN

## 2018-09-17 ENCOUNTER — TELEPHONE (OUTPATIENT)
Dept: FAMILY MEDICINE | Facility: CLINIC | Age: 38
End: 2018-09-17

## 2018-09-17 NOTE — TELEPHONE ENCOUNTER
Reason for Call:  Medication or medication refill:    Do you use a [facility]?  Name of the pharmacy and phone number for the current request:  [facility] [facility] [phone]    Name of the medication requested: prednisone   Other request: the last medication from 9/5 is not working    Can we leave a detailed message on this number? YES    Phone number patient can be reached at: Home number on file [phone] (home)    Best Time: any    Call taken on 9/17/2018 at 8:10 AM by Winter Pascual

## 2018-09-17 NOTE — TELEPHONE ENCOUNTER
Advised he needs clinic visit. Offered to schedule a visit. He declines. He will use out of town UC if needed. Advised to use a food journal to help track what his triggers are. He states he doesn't eat a lot of red meat or drink beer. We discussed a lot of other triggers but he states he avoids most triggers.     Fara Vargas, NICON, RN

## 2018-09-17 NOTE — TELEPHONE ENCOUNTER
S-(situation): Med request    B-(background): LOV 02/23/18    A-(assessment): Patient c/o gout flare for 3 weeks. Asking for allopurinol and prednisone. Has been trying Colchicine with no relief.    Pharmacy: [place]    R-(recommendations): Advised clinic visit needed.  He states he is leaving out of town and is not able to come to clinic for evaluation. Routing to provider for consideration.     Samara MURCIA RN

## 2018-09-18 NOTE — TELEPHONE ENCOUNTER
Pt informed.  He is working out of town and feeling a little better today.  He will call back to schedule a telephone visit if his symptoms worsen.  Shirley Renee RN

## 2018-11-06 ENCOUNTER — OFFICE VISIT (OUTPATIENT)
Dept: FAMILY MEDICINE | Facility: CLINIC | Age: 38
End: 2018-11-06
Payer: COMMERCIAL

## 2018-11-06 VITALS
HEART RATE: 74 BPM | SYSTOLIC BLOOD PRESSURE: 128 MMHG | HEIGHT: 71 IN | OXYGEN SATURATION: 97 % | BODY MASS INDEX: 28.31 KG/M2 | WEIGHT: 202.2 LBS | TEMPERATURE: 98.3 F | DIASTOLIC BLOOD PRESSURE: 90 MMHG

## 2018-11-06 DIAGNOSIS — I10 HTN, GOAL BELOW 140/90: Primary | ICD-10-CM

## 2018-11-06 DIAGNOSIS — R73.01 ELEVATED FASTING BLOOD SUGAR: ICD-10-CM

## 2018-11-06 DIAGNOSIS — E78.1 HIGH BLOOD TRIGLYCERIDES: ICD-10-CM

## 2018-11-06 DIAGNOSIS — Z23 NEED FOR VACCINATION: ICD-10-CM

## 2018-11-06 DIAGNOSIS — M10.9 ACUTE GOUTY ARTHRITIS: ICD-10-CM

## 2018-11-06 PROCEDURE — 99214 OFFICE O/P EST MOD 30 MIN: CPT | Mod: 25 | Performed by: FAMILY MEDICINE

## 2018-11-06 PROCEDURE — 90715 TDAP VACCINE 7 YRS/> IM: CPT | Performed by: FAMILY MEDICINE

## 2018-11-06 PROCEDURE — 90471 IMMUNIZATION ADMIN: CPT | Performed by: FAMILY MEDICINE

## 2018-11-06 RX ORDER — ALLOPURINOL 100 MG/1
100 TABLET ORAL DAILY
Qty: 90 TABLET | Refills: 3 | Status: SHIPPED | OUTPATIENT
Start: 2018-11-06 | End: 2020-01-10

## 2018-11-06 RX ORDER — ATORVASTATIN CALCIUM 20 MG/1
20 TABLET, FILM COATED ORAL DAILY
Qty: 90 TABLET | Refills: 3 | Status: SHIPPED | OUTPATIENT
Start: 2018-11-06 | End: 2020-01-10

## 2018-11-06 ASSESSMENT — PAIN SCALES - GENERAL: PAINLEVEL: MODERATE PAIN (5)

## 2018-11-06 NOTE — PROGRESS NOTES
SUBJECTIVE:                                                    Tommie Lema is 38 year old male   Chief Complaint   Patient presents with     Arthritis     States that he has reoccuring issues with gout in feet for many years. States that he was told a daily medication may be effective. States that he is currently taking indomethacine and prednisone 5 mg (states this was prescribed by another provider a few weeks ago). States that the prednisone was helpful, but then symptoms returned.         Problem list and histories reviewed & adjusted, as indicated.  Additional history: may have family history of elevated triglycerides, thinks diet is good but does drink diet pop and coffee with sweeteners.    Patient Active Problem List   Diagnosis     FAMILY HISTORY OF CARDIOVASC DIS (ISCHEMIC)     CARDIOVASCULAR SCREENING; LDL GOAL LESS THAN 130     Gout     Elevated fasting blood sugar     High blood triglycerides     Past Surgical History:   Procedure Laterality Date     ABDOMEN SURGERY       GI SURGERY       SURGICAL HISTORY OF -       inguinal hernia - left     SURGICAL HISTORY OF -       wisdom teeth       Social History   Substance Use Topics     Smoking status: Never Smoker     Smokeless tobacco: Never Used     Alcohol use Yes      Comment: 1 drink/week     Family History   Problem Relation Age of Onset     Cardiovascular Father      C.A.D. Father      29     Diabetes Maternal Grandmother      Diabetes Maternal Grandfather      Cerebrovascular Disease Paternal Grandfather      Asthma No family hx of      Hypertension No family hx of      Breast Cancer No family hx of      Cancer - colorectal No family hx of      Prostate Cancer No family hx of          Current Outpatient Prescriptions   Medication Sig Dispense Refill     indomethacin (INDOCIN) 25 MG capsule Take 1 capsule (25 mg) by mouth 2 times daily (with meals) 42 capsule 3     PREDNISONE PO Take 5 mg by mouth       No Known Allergies  Recent Labs   Lab  "Test  02/16/18   1048  03/11/16   1147  11/11/10   0425   LDL  Cannot estimate LDL when triglyceride exceeds 400 mg/dL  77  Cannot estimate LDL when triglyceride exceeds 400 mg/dL   --    HDL  26*  32*   --    TRIG  1490*  559*   --    CR  0.76   --   0.85   GFRESTIMATED  >90   --   >90   GFRESTBLACK  >90   --   >90   POTASSIUM  4.1   --   4.0      BP Readings from Last 3 Encounters:   11/06/18 (!) 132/94   02/23/18 128/88   12/07/17 139/85    Wt Readings from Last 3 Encounters:   11/06/18 202 lb 3.2 oz (91.7 kg)   02/23/18 201 lb 6.4 oz (91.4 kg)   12/07/17 190 lb (86.2 kg)         ROS:  Constitutional, HEENT, cardiovascular, pulmonary, gi and gu systems are negative, except as otherwise noted.    OBJECTIVE:                                                    BP (!) 132/94  Pulse 74  Temp 98.3  F (36.8  C) (Tympanic)  Ht 5' 10.5\" (1.791 m)  Wt 202 lb 3.2 oz (91.7 kg)  SpO2 97%  BMI 28.6 kg/m2  GENERAL APPEARANCE ADULT: Alert, no acute distress, wife present, helps with history  MS: extremities normal, no peripheral edema  PSYCH: mentation appears normal., affect and mood normal  Diagnostic Test Results:  Results for orders placed or performed in visit on 02/16/18   Lipid panel reflex to direct LDL   Result Value Ref Range    Cholesterol 266 (H) <200 mg/dL    Triglycerides 1490 (H) <150 mg/dL    HDL Cholesterol 26 (L) >39 mg/dL    LDL Cholesterol Calculated  <100 mg/dL     Cannot estimate LDL when triglyceride exceeds 400 mg/dL    Non HDL Cholesterol 240 (H) <130 mg/dL   Basic metabolic panel   Result Value Ref Range    Sodium 136 133 - 144 mmol/L    Potassium 4.1 3.4 - 5.3 mmol/L    Chloride 100 94 - 109 mmol/L    Carbon Dioxide 31 20 - 32 mmol/L    Anion Gap 5 3 - 14 mmol/L    Glucose 101 (H) 70 - 99 mg/dL    Urea Nitrogen 14 7 - 30 mg/dL    Creatinine 0.76 0.66 - 1.25 mg/dL    GFR Estimate >90 >60 mL/min/1.7m2    GFR Estimate If Black >90 >60 mL/min/1.7m2    Calcium 8.5 8.5 - 10.1 mg/dL   LDL cholesterol " direct   Result Value Ref Range    LDL Cholesterol Direct 77 <100 mg/dL   Semen Analysis Post Vasectomy   Result Value Ref Range    Post Vas Analysis No Sperm Seen NOSPRM^No Sperm Seen          ASSESSMENT/PLAN:                                                    1. HTN, goal below 140/90  Back over time pressures in clinic are often over 140/90,  Will check as out patient, get home pressure cuff calibrated and if most numbers high will need medication.  Will decrease stimulants and learn Dash diet  - Basic metabolic panel; Future    2. Elevated fasting blood sugar  - Hemoglobin A1c; Future  - Basic metabolic panel; Future    3. High blood triglycerides  - Lipid panel reflex to direct LDL Fasting; Future  - atorvastatin (LIPITOR) 20 MG tablet; Take 1 tablet (20 mg) by mouth daily  Dispense: 90 tablet; Refill: 3    4. Need for vaccination  - TDAP VACCINE (ADACEL) [29056.002]  - 1st  Administration  [26253]    5. Acute gouty arthritis  - PREDNISONE PO; Take 5 mg by mouth  - allopurinol (ZYLOPRIM) 100 MG tablet; Take 1 tablet (100 mg) by mouth daily  Dispense: 90 tablet; Refill: 3    Eileen Etienne MD  [facility]

## 2018-11-06 NOTE — PATIENT INSTRUCTIONS
1. HTN, goal below 140/90  Check 2-3 a week and record, if above goal, need meds    2. Elevated fasting blood sugar  - Hemoglobin A1c; Future  - Basic metabolic panel; Future    3. High blood triglycerides  - Lipid panel reflex to direct LDL Fasting; Future  - atorvastatin (LIPITOR) 20 MG tablet; Take 1 tablet (20 mg) by mouth daily  Dispense: 90 tablet; Refill: 3    4. Need for vaccination  - TDAP VACCINE (ADACEL) [48578.002]  - 1st  Administration  [06116]    5. Acute gouty arthritis    - PREDNISONE PO; Take 5 mg by mouth  When symptoms resolved can start   - allopurinol (ZYLOPRIM) 100 MG tablet; Take 1 tablet (100 mg) by mouth daily  Dispense: 90 tablet; Refill: 3

## 2018-11-06 NOTE — MR AVS SNAPSHOT
After Visit Summary   11/6/2018    Tommie Lema    MRN: [id number]           Patient Information     Date Of Birth          1980        Visit Information        Provider Department      11/6/2018 11:40 AM Eileen Etienne MD [facility]        Today's Diagnoses     HTN, goal below 140/90    -  1    Elevated fasting blood sugar        High blood triglycerides        Need for vaccination        Acute gouty arthritis          Care Instructions    1. HTN, goal below 140/90  Check 2-3 a week and record, if above goal, need meds    2. Elevated fasting blood sugar  - Hemoglobin A1c; Future  - Basic metabolic panel; Future    3. High blood triglycerides  - Lipid panel reflex to direct LDL Fasting; Future  - atorvastatin (LIPITOR) 20 MG tablet; Take 1 tablet (20 mg) by mouth daily  Dispense: 90 tablet; Refill: 3    4. Need for vaccination  - TDAP VACCINE (ADACEL) [09782.002]  - 1st  Administration  [76426]    5. Acute gouty arthritis    - PREDNISONE PO; Take 5 mg by mouth  When symptoms resolved can start   - allopurinol (ZYLOPRIM) 100 MG tablet; Take 1 tablet (100 mg) by mouth daily  Dispense: 90 tablet; Refill: 3            Follow-ups after your visit        Future tests that were ordered for you today     Open Future Orders        Priority Expected Expires Ordered    Hemoglobin A1c Routine  12/7/2018 11/6/2018    Basic metabolic panel Routine  12/7/2018 11/6/2018    Lipid panel reflex to direct LDL Fasting Routine  12/7/2018 11/6/2018            Who to contact     If you have questions or need follow up information about today's clinic visit or your schedule please contact [facility] directly at [phone].  Normal or non-critical lab and imaging results will be communicated to you by MyChart, letter or phone within 4 business days after the clinic has received the results. If you do not hear from us within 7 days, please contact the clinic through MyChart or phone.  "If you have a critical or abnormal lab result, we will notify you by phone as soon as possible.  Submit refill requests through [employer] or call your pharmacy and they will forward the refill request to us. Please allow 3 business days for your refill to be completed.          Additional Information About Your Visit        [employer] Information     [employer] gives you secure access to your electronic health record. If you see a primary care provider, you can also send messages to your care team and make appointments. If you have questions, please call your primary care clinic.  If you do not have a primary care provider, please call [phone] and they will assist you.        Care EveryWhere ID     This is your Care EveryWhere ID. This could be used by other organizations to access your [facility] medical records  [id number]        Your Vitals Were     Pulse Temperature Height Pulse Oximetry BMI (Body Mass Index)       74 98.3  F (36.8  C) (Tympanic) 5' 10.5\" (1.791 m) 97% 28.6 kg/m2        Blood Pressure from Last 3 Encounters:   11/06/18 (!) 132/94   02/23/18 128/88   12/07/17 139/85    Weight from Last 3 Encounters:   11/06/18 202 lb 3.2 oz (91.7 kg)   02/23/18 201 lb 6.4 oz (91.4 kg)   12/07/17 190 lb (86.2 kg)              We Performed the Following     1st  Administration  [20356]     TDAP VACCINE (ADACEL) [25692.002]          Today's Medication Changes          These changes are accurate as of 11/6/18 12:31 PM.  If you have any questions, ask your nurse or doctor.               Start taking these medicines.        Dose/Directions    allopurinol 100 MG tablet   Commonly known as:  ZYLOPRIM   Used for:  Acute gouty arthritis   Started by:  Eileen Etienne MD        Dose:  100 mg   Take 1 tablet (100 mg) by mouth daily   Quantity:  90 tablet   Refills:  3       atorvastatin 20 MG tablet   Commonly known as:  LIPITOR   Used for:  HTN, goal below 140/90, High blood triglycerides   Started by:  Eileen Etienne " MD Deedee        Dose:  20 mg   Take 1 tablet (20 mg) by mouth daily   Quantity:  90 tablet   Refills:  3         Stop taking these medicines if you haven't already. Please contact your care team if you have questions.     colchicine 0.6 MG tablet   Commonly known as:  COLCRYS   Stopped by:  Eileen Etienne MD           ibuprofen 200 MG tablet   Commonly known as:  ADVIL/MOTRIN   Stopped by:  Eileen Etienne MD                Where to get your medicines      These medications were sent to [facility] [address]  [address] [address]     Phone:  [phone]     allopurinol 100 MG tablet    atorvastatin 20 MG tablet                Primary Care Provider Office Phone # Fax #    Paddy Salcido [phone]       [address] [address]        Equal Access to Services     [facility]: Hadii linh gutierrez hadasho Soomaali, waaxda luqadaha, qaybta kaalmada adeegyada, sunil freedin haychepen jessica newman . So [facility] [phone].    ATENCIÓN: Si habla español, tiene a joaquin disposición servicios gratuitos de asistencia lingüística. Dhruv al [phone].    We comply with applicable federal civil rights laws and [place] laws. We do not discriminate on the basis of race, color, national origin, age, disability, sex, sexual orientation, or gender identity.            Thank you!     Thank you for choosing [facility]  for your care. Our goal is always to provide you with excellent care. Hearing back from our patients is one way we can continue to improve our services. Please take a few minutes to complete the written survey that you may receive in the mail after your visit with us. Thank you!             Your Updated Medication List - Protect others around you: Learn how to safely use, store and throw away your medicines at www.disposemymeds.org.          This list is accurate as of 11/6/18 12:31 PM.  Always use your most  recent med list.                   Brand Name Dispense Instructions for use Diagnosis    allopurinol 100 MG tablet    ZYLOPRIM    90 tablet    Take 1 tablet (100 mg) by mouth daily    Acute gouty arthritis       atorvastatin 20 MG tablet    LIPITOR    90 tablet    Take 1 tablet (20 mg) by mouth daily    HTN, goal below 140/90, High blood triglycerides       indomethacin 25 MG capsule    INDOCIN    42 capsule    Take 1 capsule (25 mg) by mouth 2 times daily (with meals)    Acute gout of left ankle, unspecified cause       PREDNISONE PO      Take 5 mg by mouth    Acute gouty arthritis

## 2018-11-06 NOTE — NURSING NOTE
"Initial BP (!) 132/94  Pulse 74  Temp 98.3  F (36.8  C) (Tympanic)  Ht 5' 10.5\" (1.791 m)  Wt 202 lb 3.2 oz (91.7 kg)  SpO2 97%  BMI 28.6 kg/m2 Estimated body mass index is 28.6 kg/(m^2) as calculated from the following:    Height as of this encounter: 5' 10.5\" (1.791 m).    Weight as of this encounter: 202 lb 3.2 oz (91.7 kg). .      "

## 2018-11-13 PROBLEM — I10 HTN, GOAL BELOW 140/90: Status: ACTIVE | Noted: 2018-11-13

## 2018-11-30 DIAGNOSIS — I10 HTN, GOAL BELOW 140/90: ICD-10-CM

## 2018-11-30 DIAGNOSIS — E78.1 HIGH BLOOD TRIGLYCERIDES: ICD-10-CM

## 2018-11-30 DIAGNOSIS — R73.01 ELEVATED FASTING BLOOD SUGAR: ICD-10-CM

## 2018-11-30 LAB
ANION GAP SERPL CALCULATED.3IONS-SCNC: 7 MMOL/L (ref 3–14)
BUN SERPL-MCNC: 15 MG/DL (ref 7–30)
CALCIUM SERPL-MCNC: 8.8 MG/DL (ref 8.5–10.1)
CHLORIDE SERPL-SCNC: 102 MMOL/L (ref 94–109)
CHOLEST SERPL-MCNC: 265 MG/DL
CO2 SERPL-SCNC: 30 MMOL/L (ref 20–32)
CREAT SERPL-MCNC: 0.85 MG/DL (ref 0.66–1.25)
GFR SERPL CREATININE-BSD FRML MDRD: >90 ML/MIN/1.7M2
GLUCOSE SERPL-MCNC: 87 MG/DL (ref 70–99)
HBA1C MFR BLD: 5 % (ref 0–5.6)
HDLC SERPL-MCNC: 37 MG/DL
LDLC SERPL CALC-MCNC: ABNORMAL MG/DL
LDLC SERPL DIRECT ASSAY-MCNC: 146 MG/DL
NONHDLC SERPL-MCNC: 228 MG/DL
POTASSIUM SERPL-SCNC: 4 MMOL/L (ref 3.4–5.3)
SODIUM SERPL-SCNC: 139 MMOL/L (ref 133–144)
TRIGL SERPL-MCNC: 430 MG/DL

## 2018-11-30 PROCEDURE — 83036 HEMOGLOBIN GLYCOSYLATED A1C: CPT | Performed by: FAMILY MEDICINE

## 2018-11-30 PROCEDURE — 83721 ASSAY OF BLOOD LIPOPROTEIN: CPT | Mod: 59 | Performed by: FAMILY MEDICINE

## 2018-11-30 PROCEDURE — 80048 BASIC METABOLIC PNL TOTAL CA: CPT | Performed by: FAMILY MEDICINE

## 2018-11-30 PROCEDURE — 80061 LIPID PANEL: CPT | Performed by: FAMILY MEDICINE

## 2018-11-30 PROCEDURE — 36415 COLL VENOUS BLD VENIPUNCTURE: CPT | Performed by: FAMILY MEDICINE

## 2019-03-27 ENCOUNTER — TELEPHONE (OUTPATIENT)
Dept: FAMILY MEDICINE | Facility: CLINIC | Age: 39
End: 2019-03-27

## 2019-03-27 DIAGNOSIS — E78.1 HIGH BLOOD TRIGLYCERIDES: ICD-10-CM

## 2019-03-27 DIAGNOSIS — I10 HTN, GOAL BELOW 140/90: ICD-10-CM

## 2019-03-27 DIAGNOSIS — Z13.6 CARDIOVASCULAR SCREENING; LDL GOAL LESS THAN 130: ICD-10-CM

## 2019-03-27 DIAGNOSIS — Z82.49 FAMILY HISTORY OF ISCHEMIC HEART DISEASE: Primary | ICD-10-CM

## 2019-03-27 NOTE — TELEPHONE ENCOUNTER
Reason for Call: Request for an order or referral:    Order or referral being requested: a CT calcium score test done [facility] heart    Date needed: at your convenience    Has the patient been seen by the PCP for this problem? NO    Additional comments: fax order [phone]    Phone number Patient can be reached at:  Home number on file [phone] (home)    Best Time:  any    Can we leave a detailed message on this number?  YES    Call taken on 3/27/2019 at 9:41 AM by Winter Pascual

## 2019-03-27 NOTE — TELEPHONE ENCOUNTER
Order signed.  This test can be helpful to see the cholesterol build up to decide if we need to do more like increase lipitor or do stress test.    Thanks,  Paddy Salcido MD

## 2019-03-27 NOTE — TELEPHONE ENCOUNTER
Patient notified through voicemail that what he was requesting has been faxed and to call with any questions/ concerns. Margaret LUIS RN

## 2019-03-27 NOTE — TELEPHONE ENCOUNTER
"Dr. Salcido,    Patient states he reached out to [facility] to get this test done. He states he is requesting this test \"too see if he is blocked\". He states he has a family history of heart issues. He is requesting this as a preventative thing as his father had a heart attack at 39.     He is aware that insurance will not cover. He states [facility] is asking PCP to place the order.    LOV 11/06/2018    Thanks,  Margaret LUIS RN    "

## 2019-04-01 ENCOUNTER — TRANSFERRED RECORDS (OUTPATIENT)
Dept: HEALTH INFORMATION MANAGEMENT | Facility: CLINIC | Age: 39
End: 2019-04-01

## 2019-04-03 ENCOUNTER — TELEPHONE (OUTPATIENT)
Dept: FAMILY MEDICINE | Facility: CLINIC | Age: 39
End: 2019-04-03

## 2019-04-03 NOTE — TELEPHONE ENCOUNTER
I found the report in Care Everywhere as noted below.    Routed to Dr Salcido.    Pt asks for reply and further directions by [place].   Also, he wants a copy of his results.    Thank you.  Shirley Renee RN

## 2019-04-03 NOTE — TELEPHONE ENCOUNTER
Procedure Note - Cheo Camp DO - 2019 7:29 AM CDT    [facility]  [address] [place] [address]  [phone]    STUDY: CT CARDIAC CALCIUM SCORING DUAL READ [67786.0]    PATIENT NAME: Tommie Lema  : 1980  MRN: [id number]  PCP:ZABRINA POTTS  ORDERING PROVIDER: Paddy Salcido  STUDY DATE: 2019 7:29 AM    Test indication: Screening     Cardiac Risk Factors: Hypercholesterolemia and Family CAD     Study Parameters: High resolution cardiac gated scan with flash scan protocol for coronary calcium score.     Scan heart rate, Effective MAS, Total DLP available in scans  KVP: 120  Effective dose: 0.5 milliseverts  Study Quality: Good     Total calcium score:   Calcium Score: 0  Left Main: 0   LAD: 0  LCX: 0  RCA: 0    Calcium Score Percentile:   0% of asymptomatic people of the same age, gender, and race will have a lower calcium score.     Cardiac Structures:   Cardiac chamber size is normal  Pericardium is Normal    Please refer to the radiologist report for the extra cardiac findings as these are reported separately as an over read.    Findings:   1. Non cardiac findings will be reported by the radiologist, please refer to associated report.  2. Calcium score of 0   3. 0% of asymptomatic people of the same age, gender, and race will have a lower calcium score.     Cheo Camp DO, [facility]        OVER-READ OVER-READ     Clinical History: Over-read of noncardiac structures.    Technique: Please see cardiology report for technical details.    Findings: No significant infiltrates, nodules, or masses in the visualized lungs. No visualized adenopathy, pleural effusions, or osseous pathology. No aortic aneurysm.     Impression:  1. Review of the noncardiac structures included on the exam demonstrate no significant pathology.   2. Cardiac findings will be reported by cardiology.     [facility]

## 2019-04-03 NOTE — TELEPHONE ENCOUNTER
Pt had a CT Coronary Calium Scan done at [facility] on 4/1/19. They are to send us the results. It was ordered by Dr Salcido. Please call as soon as we get them.  He is wondering if they will be sent to [facility] since it was done at an outside facility?  Mike - [phone] ok to leave message

## 2019-04-03 NOTE — TELEPHONE ENCOUNTER
No, I have not seen the result. No fax at my desk or copied in Epic.    Thanks,  Paddy Salcido MD

## 2019-04-03 NOTE — TELEPHONE ENCOUNTER
Dr. Salcido,    Patient asking for CT Cardiac Calcium Score results. Have you received faxed results from [facility] regarding CT Cardiac Calcium Score? It does appear that the visit is available through Care Everywhere.        NICO QuinonesN, RN

## 2019-07-11 ENCOUNTER — TELEPHONE (OUTPATIENT)
Dept: FAMILY MEDICINE | Facility: CLINIC | Age: 39
End: 2019-07-11

## 2019-07-11 NOTE — LETTER
July 11, 2019      Tommie Lema  [address] [place] [address]        Dear Tommie,         At [facility] we care about your health and are committed to providing quality patient care, which includes staying current on preventative cancer screenings.  You can increase your chances of finding and treating cancers through regular screenings.      Our records show that you are due for the following screening(s):    Blood Pressure Recheck - [phone] to schedule a nurse only visit  During a recent visit to our clinic, your blood pressure was elevated above the target range for your health.   BP Readings from Last 3 Encounters:   11/06/18 128/90   02/23/18 128/88   12/07/17 139/85       Please schedule a free appointment for a blood pressure check with nurse in the near future.  You can call [phone] to do this.      Why is high blood pressure a big deal?    If blood pressure is elevated above target levels you have an increased risk of experiencing a heart attack or stroke, developing heart failure, kidney disease or other chronic diseases.    With appropriate early recognition and treatment, these health problems can be avoided in most cases.  Your physician can help you determine the need for treatment and discuss the non-medication and medication routes to treating high blood pressure as well as evaluate you for possible causes and effects of high blood pressure.    The target range for ideal blood pressure control is less than 140/90 for most individuals.  For those who have been diagnosed with heart disease, diabetes, stroke or peripheral vascular disease this target range is less than 130/80.      If you have already had one or all of the above screening tests at another facility, please call us so that we may update your chart.      Your partners in health,      Quality Committee   [facility]

## 2019-07-11 NOTE — TELEPHONE ENCOUNTER
Panel Management Review      Summary:    Patient is due/failing the following:   BP CHECK    Action needed:   Patient needs nurse only appointment.    Type of outreach:    Sent [email or web address] message.    Questions for provider review:    None                                                                                                                                    Jennifer PANTOJA CMA       Chart routed to None .

## 2019-11-02 ENCOUNTER — HEALTH MAINTENANCE LETTER (OUTPATIENT)
Age: 39
End: 2019-11-02

## 2020-01-10 ENCOUNTER — OFFICE VISIT (OUTPATIENT)
Dept: FAMILY MEDICINE | Facility: CLINIC | Age: 40
End: 2020-01-10
Payer: COMMERCIAL

## 2020-01-10 VITALS
WEIGHT: 213.6 LBS | RESPIRATION RATE: 16 BRPM | TEMPERATURE: 97.1 F | HEART RATE: 81 BPM | OXYGEN SATURATION: 96 % | HEIGHT: 72 IN | BODY MASS INDEX: 28.93 KG/M2 | DIASTOLIC BLOOD PRESSURE: 96 MMHG | SYSTOLIC BLOOD PRESSURE: 132 MMHG

## 2020-01-10 DIAGNOSIS — I10 HTN, GOAL BELOW 140/80: Primary | ICD-10-CM

## 2020-01-10 DIAGNOSIS — M10.9 ACUTE GOUTY ARTHRITIS: ICD-10-CM

## 2020-01-10 DIAGNOSIS — E78.1 HIGH BLOOD TRIGLYCERIDES: ICD-10-CM

## 2020-01-10 PROCEDURE — 99214 OFFICE O/P EST MOD 30 MIN: CPT | Performed by: FAMILY MEDICINE

## 2020-01-10 RX ORDER — LISINOPRIL 2.5 MG/1
2.5 TABLET ORAL DAILY
Qty: 90 TABLET | Refills: 3 | Status: SHIPPED | OUTPATIENT
Start: 2020-01-10 | End: 2021-01-07

## 2020-01-10 RX ORDER — NEBULIZER AND COMPRESSOR
1 EACH MISCELLANEOUS 2 TIMES DAILY
Qty: 1 KIT | Refills: 1 | Status: SHIPPED | OUTPATIENT
Start: 2020-01-10

## 2020-01-10 RX ORDER — ATORVASTATIN CALCIUM 20 MG/1
20 TABLET, FILM COATED ORAL DAILY
Qty: 90 TABLET | Refills: 3 | Status: SHIPPED | OUTPATIENT
Start: 2020-01-10 | End: 2021-01-07

## 2020-01-10 RX ORDER — ALLOPURINOL 100 MG/1
100 TABLET ORAL DAILY
Qty: 90 TABLET | Refills: 3 | Status: SHIPPED | OUTPATIENT
Start: 2020-01-10 | End: 2021-01-07

## 2020-01-10 ASSESSMENT — MIFFLIN-ST. JEOR: SCORE: 1921.88

## 2020-01-10 NOTE — PROGRESS NOTES
SUBJECTIVE:                                                    Tommie Lema is 39 year old male   Chief Complaint   Patient presents with     Recheck Medication     Hyperlipidemia Follow-Up      Are you regularly taking any medication or supplement to lower your cholesterol?   Yes- Atorvastatin    Are you having muscle aches or other side effects that you think could be caused by your cholesterol lowering medication?  Yes- has felt some joint aching since starting statin      How many servings of fruits and vegetables do you eat daily?  4 or more    On average, how many sweetened beverages do you drink each day (Examples: soda, juice, sweet tea, etc.  Do NOT count diet or artificially sweetened beverages)?   0    How many days per week do you miss taking your medication? 0      Problem list and histories reviewed & adjusted, as indicated.  Additional history: as documented    Patient Active Problem List   Diagnosis     FAMILY HISTORY OF CARDIOVASC DIS (ISCHEMIC)     CARDIOVASCULAR SCREENING; LDL GOAL LESS THAN 130     Gout     Elevated fasting blood sugar     High blood triglycerides     HTN, goal below 140/90     Past Surgical History:   Procedure Laterality Date     ABDOMEN SURGERY       GI SURGERY       SURGICAL HISTORY OF -       inguinal hernia - left     SURGICAL HISTORY OF -       wisdom teeth       Social History     Tobacco Use     Smoking status: Never Smoker     Smokeless tobacco: Never Used   Substance Use Topics     Alcohol use: Yes     Comment: 1 drink/week     Family History   Problem Relation Age of Onset     Cardiovascular Father      C.A.D. Father         29     Diabetes Maternal Grandmother      Diabetes Maternal Grandfather      Cerebrovascular Disease Paternal Grandfather      Asthma No family hx of      Hypertension No family hx of      Breast Cancer No family hx of      Cancer - colorectal No family hx of      Prostate Cancer No family hx of          Current Outpatient Medications    Medication Sig Dispense Refill     allopurinol (ZYLOPRIM) 100 MG tablet Take 1 tablet (100 mg) by mouth daily 90 tablet 3     atorvastatin (LIPITOR) 20 MG tablet Take 1 tablet (20 mg) by mouth daily 90 tablet 3     No Known Allergies  Recent Labs   Lab Test 11/30/18  1357 02/16/18  1048 03/11/16  1147   A1C 5.0  --   --    LDL Cannot estimate LDL when triglyceride exceeds 400 mg/dL  146* Cannot estimate LDL when triglyceride exceeds 400 mg/dL  77 Cannot estimate LDL when triglyceride exceeds 400 mg/dL   HDL 37* 26* 32*   TRIG 430* 1,490* 559*   CR 0.85 0.76  --    GFRESTIMATED >90 >90  --    GFRESTBLACK >90 >90  --    POTASSIUM 4.0 4.1  --       BP Readings from Last 3 Encounters:   01/10/20 (!) 132/96   11/06/18 128/90   02/23/18 128/88    Wt Readings from Last 3 Encounters:   01/10/20 96.9 kg (213 lb 9.6 oz)   11/06/18 91.7 kg (202 lb 3.2 oz)   02/23/18 91.4 kg (201 lb 6.4 oz)         ROS:  Constitutional, HEENT, cardiovascular, pulmonary, gi and gu systems are negative, except as otherwise noted.    OBJECTIVE:                                                    BP (!) 132/96 (BP Location: Right arm, Patient Position: Chair, Cuff Size: Adult Regular)   Pulse 81   Temp 97.1  F (36.2  C) (Tympanic)   Resp 16   Ht 1.829 m (6')   Wt 96.9 kg (213 lb 9.6 oz)   SpO2 96%   BMI 28.97 kg/m    GENERAL APPEARANCE ADULT: Alert, no acute distress  RESP: lungs clear to auscultation   CV: normal rate, regular rhythm, no murmur or gallop  PSYCH: mentation appears normal., affect and mood normal  Diagnostic Test Results:  none      ASSESSMENT/PLAN:                                                    1. High blood triglycerides  Started lipitor and having joint pain in elbow and neck.  Low dose, can change but wishes to see labs first and can tolerate.  - atorvastatin (LIPITOR) 20 MG tablet; Take 1 tablet (20 mg) by mouth daily  Dispense: 90 tablet; Refill: 3  - Lipid panel reflex to direct LDL Fasting; Future  - Basic  metabolic panel; Future    2. Acute gouty arthritis  No flares on allopurinol.  due for review and refill, taking medication without difficulty  - allopurinol (ZYLOPRIM) 100 MG tablet; Take 1 tablet (100 mg) by mouth daily  Dispense: 90 tablet; Refill: 3    3. HTN, goal below 140/80  Recheck blood pressure in 3 weeks with nurse.  Have home cuff calibrated.  - lisinopril (PRINIVIL/ZESTRIL) 2.5 MG tablet; Take 1 tablet (2.5 mg) by mouth daily  Dispense: 90 tablet; Refill: 3  - Blood Pressure Monitoring (ADULT BLOOD PRESSURE CUFF LG) KIT; 1 Device 2 times daily  Dispense: 1 kit; Refill: 1    Eileen Etienne MD  [facility]

## 2020-01-10 NOTE — NURSING NOTE
Chief Complaint   Patient presents with     Recheck Medication       Initial BP (!) 132/96 (BP Location: Right arm, Patient Position: Chair, Cuff Size: Adult Regular)   Pulse 81   Temp 97.1  F (36.2  C) (Tympanic)   Resp 16   Ht 1.829 m (6')   Wt 96.9 kg (213 lb 9.6 oz)   SpO2 96%   BMI 28.97 kg/m   Estimated body mass index is 28.97 kg/m  as calculated from the following:    Height as of this encounter: 1.829 m (6').    Weight as of this encounter: 96.9 kg (213 lb 9.6 oz).    Patient presents to the clinic using No DME    Health Maintenance that is potentially due pending provider review:  Severiano Cortez MA  2:30 PM 1/10/2020  .

## 2020-01-14 NOTE — PATIENT INSTRUCTIONS
To Lab    Eye doctor appointment for preventative screening recommended every 5 years.    Call to schedule the scrotal ultrasound at [phone] to ensure this is just the varicose vein that is more distended or may have a small blood clot.  Take Aspirin daily.   Supportive underwear or jock strap.       Preventive Health Recommendations  Male Ages 26 - 39    Yearly exam:             See your health care provider every year in order to  o   Review health changes.   o   Discuss preventive care.    o   Review your medicines if your doctor has prescribed any.    You should be tested each year for STDs (sexually transmitted diseases), if you re at risk.     After age 35, talk to your provider about cholesterol testing. If you are at risk for heart disease, have your cholesterol tested at least every 5 years.     If you are at risk for diabetes, you should have a diabetes test (fasting glucose).  Shots: Get a flu shot each year. Get a tetanus shot every 10 years.     Nutrition:    Eat at least 5 servings of fruits and vegetables daily.     Eat whole-grain bread, whole-wheat pasta and brown rice instead of white grains and rice.     Get adequate Calcium and Vitamin D.     Lifestyle    Exercise for at least 150 minutes a week (30 minutes a day, 5 days a week). This will help you control your weight and prevent disease.     Limit alcohol to one drink per day.     No smoking.     Wear sunscreen to prevent skin cancer.     See your dentist every six months for an exam and cleaning.

## 2020-01-14 NOTE — PROGRESS NOTES
3  SUBJECTIVE:   CC: Tommie Lema is an 39 year old male who presents for preventive health visit.     Healthy Habits:    Do you get at least three servings of calcium containing foods daily (dairy, green leafy vegetables, etc.)? yes    Amount of exercise or daily activities, outside of work: 2 day(s) per week    Problems taking medications regularly No    Medication side effects: No    Have you had an eye exam in the past two years? no    Do you see a dentist twice per year? yes    Do you have sleep apnea, excessive snoring or daytime drowsiness?no    Concerns: Vasectomy concerns. Vasectomy was done 12/7/2017 by Dr. Escobedo. Constant discomfort on right side below the testicle. No trouble with urination.    Today's PHQ-2 Score:   PHQ-2 ( 1999 Pfizer) 11/6/2018 12/4/2017   Q1: Little interest or pleasure in doing things 0 0   Q2: Feeling down, depressed or hopeless 0 0   PHQ-2 Score 0 0       Abuse: Current or Past(Physical, Sexual or Emotional)- No  Do you feel safe in your environment? Yes        Social History     Tobacco Use     Smoking status: Never Smoker     Smokeless tobacco: Never Used   Substance Use Topics     Alcohol use: Yes     Comment: 1 drink/week     If you drink alcohol do you typically have >3 drinks per day or >7 drinks per week? No                      Last PSA: No results found for: PSA    Reviewed orders with patient. Reviewed health maintenance and updated orders accordingly - Yes  BP Readings from Last 3 Encounters:   01/15/20 112/82   01/10/20 (!) 132/96   11/06/18 128/90    Wt Readings from Last 3 Encounters:   01/15/20 95.7 kg (211 lb)   01/10/20 96.9 kg (213 lb 9.6 oz)   11/06/18 91.7 kg (202 lb 3.2 oz)                    Reviewed and updated as needed this visit by clinical staff         Reviewed and updated as needed this visit by Provider            ROS:  CONSTITUTIONAL: NEGATIVE for fever, chills, change in weight  INTEGUMENTARY/SKIN: NEGATIVE for worrisome rashes, moles or  "lesions  EYES: NEGATIVE for vision changes or irritation  ENT: NEGATIVE for ear, mouth and throat problems  RESP: NEGATIVE for significant cough or SOB  CV: NEGATIVE for chest pain, palpitations or peripheral edema  GI: NEGATIVE for nausea, abdominal pain, heartburn, or change in bowel habits   male: negative for dysuria, hematuria, decreased urinary stream, erectile dysfunction, urethral discharge  MUSCULOSKELETAL: NEGATIVE for significant arthralgias or myalgia  NEURO: NEGATIVE for weakness, dizziness or paresthesias  PSYCHIATRIC: NEGATIVE for changes in mood or affect    OBJECTIVE:   /82   Pulse 74   Temp 97.2  F (36.2  C) (Tympanic)   Resp 16   Ht 1.816 m (5' 11.5\")   Wt 95.7 kg (211 lb)   SpO2 98%   BMI 29.02 kg/m    EXAM:  GENERAL: healthy, alert and no distress  EYES: Eyes grossly normal to inspection, PERRL and conjunctivae and sclerae normal  HENT: ear canals and TM's normal, nose and mouth without ulcers or lesions  NECK: no adenopathy, no asymmetry, masses, or scars and thyroid normal to palpation  RESP: lungs clear to auscultation - no rales, rhonchi or wheezes  CV: regular rate and rhythm, normal S1 S2, no S3 or S4, no murmur, click or rub, no peripheral edema and peripheral pulses strong  ABDOMEN: soft, nontender, no hepatosplenomegaly, no masses and bowel sounds normal   (male): testicles normal without atrophy or masses, varicocele present bilateral, the right does go down from above testicle down the lateral side to lower lateral side of testicle mildly tender to palpation, no hernias and penis normal without urethral discharge  MS: no gross musculoskeletal defects noted, no edema  SKIN: no suspicious lesions or rashes  NEURO: Normal strength and tone, mentation intact and speech normal  PSYCH: mentation appears normal, affect normal/bright    Diagnostic Test Results:  Labs reviewed in Epic    ASSESSMENT/PLAN:   Tommie was seen today for physical.    Diagnoses and all orders for " "this visit:    Routine general medical examination at a health care facility    Pain in right testicle: most likely due to the varicocele.  -     US Testicular & Scrotum w Doppler Ltd; Future    Varicocele        COUNSELING:  Reviewed preventive health counseling, as reflected in patient instructions       Regular exercise       Healthy diet/nutrition       Vision screening       HIV screeninx in teen years, 1x in adult years, and at intervals if high risk    Estimated body mass index is 29.02 kg/m  as calculated from the following:    Height as of this encounter: 1.816 m (5' 11.5\").    Weight as of this encounter: 95.7 kg (211 lb).    Weight management plan: Discussed healthy diet and exercise guidelines     reports that he has never smoked. He has never used smokeless tobacco.      Counseling Resources:  ATP IV Guidelines  Pooled Cohorts Equation Calculator  FRAX Risk Assessment  ICSI Preventive Guidelines  Dietary Guidelines for Americans,   USDA's MyPlate  ASA Prophylaxis  Lung CA Screening    Paddy Salcido MD  [facility]  "

## 2020-01-15 ENCOUNTER — OFFICE VISIT (OUTPATIENT)
Dept: FAMILY MEDICINE | Facility: CLINIC | Age: 40
End: 2020-01-15
Payer: COMMERCIAL

## 2020-01-15 VITALS
TEMPERATURE: 97.2 F | OXYGEN SATURATION: 98 % | HEART RATE: 74 BPM | HEIGHT: 72 IN | BODY MASS INDEX: 28.58 KG/M2 | DIASTOLIC BLOOD PRESSURE: 82 MMHG | WEIGHT: 211 LBS | RESPIRATION RATE: 16 BRPM | SYSTOLIC BLOOD PRESSURE: 112 MMHG

## 2020-01-15 DIAGNOSIS — Z00.00 ROUTINE GENERAL MEDICAL EXAMINATION AT A HEALTH CARE FACILITY: Primary | ICD-10-CM

## 2020-01-15 DIAGNOSIS — I86.1 VARICOCELE: ICD-10-CM

## 2020-01-15 DIAGNOSIS — E78.1 HIGH BLOOD TRIGLYCERIDES: ICD-10-CM

## 2020-01-15 DIAGNOSIS — N50.811 PAIN IN RIGHT TESTICLE: ICD-10-CM

## 2020-01-15 LAB
ANION GAP SERPL CALCULATED.3IONS-SCNC: 5 MMOL/L (ref 3–14)
BUN SERPL-MCNC: 14 MG/DL (ref 7–30)
CALCIUM SERPL-MCNC: 9.1 MG/DL (ref 8.5–10.1)
CHLORIDE SERPL-SCNC: 103 MMOL/L (ref 94–109)
CHOLEST SERPL-MCNC: 203 MG/DL
CO2 SERPL-SCNC: 30 MMOL/L (ref 20–32)
CREAT SERPL-MCNC: 0.89 MG/DL (ref 0.66–1.25)
GFR SERPL CREATININE-BSD FRML MDRD: >90 ML/MIN/{1.73_M2}
GLUCOSE SERPL-MCNC: 100 MG/DL (ref 70–99)
HDLC SERPL-MCNC: 33 MG/DL
LDLC SERPL CALC-MCNC: ABNORMAL MG/DL
LDLC SERPL DIRECT ASSAY-MCNC: 64 MG/DL
NONHDLC SERPL-MCNC: 170 MG/DL
POTASSIUM SERPL-SCNC: 4 MMOL/L (ref 3.4–5.3)
SODIUM SERPL-SCNC: 138 MMOL/L (ref 133–144)
TRIGL SERPL-MCNC: 850 MG/DL

## 2020-01-15 PROCEDURE — 36415 COLL VENOUS BLD VENIPUNCTURE: CPT | Performed by: FAMILY MEDICINE

## 2020-01-15 PROCEDURE — 80061 LIPID PANEL: CPT | Performed by: FAMILY MEDICINE

## 2020-01-15 PROCEDURE — 83721 ASSAY OF BLOOD LIPOPROTEIN: CPT | Mod: 59 | Performed by: FAMILY MEDICINE

## 2020-01-15 PROCEDURE — 99395 PREV VISIT EST AGE 18-39: CPT | Performed by: FAMILY MEDICINE

## 2020-01-15 PROCEDURE — 80048 BASIC METABOLIC PNL TOTAL CA: CPT | Performed by: FAMILY MEDICINE

## 2020-01-15 ASSESSMENT — MIFFLIN-ST. JEOR: SCORE: 1902.15

## 2020-11-16 ENCOUNTER — HEALTH MAINTENANCE LETTER (OUTPATIENT)
Age: 40
End: 2020-11-16

## 2021-01-06 DIAGNOSIS — E78.1 HIGH BLOOD TRIGLYCERIDES: ICD-10-CM

## 2021-01-06 DIAGNOSIS — I10 HTN, GOAL BELOW 140/80: ICD-10-CM

## 2021-01-06 DIAGNOSIS — M10.9 ACUTE GOUTY ARTHRITIS: ICD-10-CM

## 2021-01-06 NOTE — TELEPHONE ENCOUNTER
Patient has future appt, but needs refill until OV.   Next 5 appointments (look out 90 days)    Jan 22, 2021  7:00 AM  PHYSICAL with Paddy Salcido MD  [facility] ([facility])  Arrive at: [facility] [address] [id number]  [phone]         [facility]    Tete BEARD  [facility]

## 2021-01-07 RX ORDER — ATORVASTATIN CALCIUM 20 MG/1
20 TABLET, FILM COATED ORAL DAILY
Qty: 30 TABLET | Refills: 0 | Status: SHIPPED | OUTPATIENT
Start: 2021-01-07 | End: 2021-01-22

## 2021-01-07 RX ORDER — ALLOPURINOL 100 MG/1
100 TABLET ORAL DAILY
Qty: 30 TABLET | Refills: 0 | Status: SHIPPED | OUTPATIENT
Start: 2021-01-07 | End: 2021-01-22

## 2021-01-07 RX ORDER — LISINOPRIL 2.5 MG/1
2.5 TABLET ORAL DAILY
Qty: 30 TABLET | Refills: 0 | Status: SHIPPED | OUTPATIENT
Start: 2021-01-07 | End: 2021-01-22

## 2021-01-07 NOTE — TELEPHONE ENCOUNTER
"Requested Prescriptions   Pending Prescriptions Disp Refills     atorvastatin (LIPITOR) 20 MG tablet 30 tablet 0     Sig: Take 1 tablet (20 mg) by mouth daily       Statins Protocol Passed - 1/6/2021  2:35 PM        Passed - LDL on file in past 12 months     Recent Labs   Lab Test 01/15/20  0907   LDL Cannot estimate LDL when triglyceride exceeds 400 mg/dL  64             Passed - No abnormal creatine kinase in past 12 months     No lab results found.             Passed - Recent (12 mo) or future (30 days) visit within the authorizing provider's specialty     Patient has had an office visit with the authorizing provider or a provider within the authorizing providers department within the previous 12 mos or has a future within next 30 days. See \"Patient Info\" tab in inbasket, or \"Choose Columns\" in Meds & Orders section of the refill encounter.              Passed - Medication is active on med list        Passed - Patient is age 18 or older           allopurinol (ZYLOPRIM) 100 MG tablet 30 tablet 0     Sig: Take 1 tablet (100 mg) by mouth daily       Gout Agents Protocol Failed - 1/6/2021  2:35 PM        Failed - CBC on file in past 12 months     Recent Labs   Lab Test 03/07/13  1507   WBC 6.5   RBC 4.86   HGB 14.9   HCT 42.0                    Failed - ALT on file in past 12 months     No lab results found.          Failed - Has Uric Acid on file in past 12 months and value is less than 6     Recent Labs   Lab Test 03/07/13  1507   URIC 7.8     If level is 6mg/dL or greater, ok to refill one time and refer to provider.           Passed - Recent (12 mo) or future (30 days) visit within the authorizing provider's specialty     Patient has had an office visit with the authorizing provider or a provider within the authorizing providers department within the previous 12 mos or has a future within next 30 days. See \"Patient Info\" tab in inbasket, or \"Choose Columns\" in Meds & Orders section of the refill " "encounter.              Passed - Medication is active on med list        Passed - Patient is age 18 or older        Passed - Normal serum creatinine on file in the past 12 months     Recent Labs   Lab Test 01/15/20  0907   CR 0.89       Ok to refill medication if creatinine is low             lisinopril (ZESTRIL) 2.5 MG tablet 30 tablet 0     Sig: Take 1 tablet (2.5 mg) by mouth daily       ACE Inhibitors (Including Combos) Protocol Passed - 1/6/2021  2:35 PM        Passed - Blood pressure under 140/90 in past 12 months     BP Readings from Last 3 Encounters:   01/15/20 112/82   01/10/20 (!) 132/96   11/06/18 128/90                 Passed - Recent (12 mo) or future (30 days) visit within the authorizing provider's specialty     Patient has had an office visit with the authorizing provider or a provider within the authorizing providers department within the previous 12 mos or has a future within next 30 days. See \"Patient Info\" tab in inbasket, or \"Choose Columns\" in Meds & Orders section of the refill encounter.              Passed - Medication is active on med list        Passed - Patient is age 18 or older        Passed - Normal serum creatinine on file in past 12 months     Recent Labs   Lab Test 01/15/20  0907   CR 0.89       Ok to refill medication if creatinine is low          Passed - Normal serum potassium on file in past 12 months     Recent Labs   Lab Test 01/15/20  0907   POTASSIUM 4.0                Medication is being filled for 1 time refill only due to:  Patient needs labs uric acid, CBC. Patient needs to be seen because it has been more than one year since last visit.    "

## 2021-01-22 ENCOUNTER — OFFICE VISIT (OUTPATIENT)
Dept: FAMILY MEDICINE | Facility: CLINIC | Age: 41
End: 2021-01-22
Payer: COMMERCIAL

## 2021-01-22 VITALS
SYSTOLIC BLOOD PRESSURE: 122 MMHG | WEIGHT: 213.6 LBS | BODY MASS INDEX: 29.9 KG/M2 | TEMPERATURE: 97.6 F | HEART RATE: 71 BPM | OXYGEN SATURATION: 98 % | DIASTOLIC BLOOD PRESSURE: 88 MMHG | RESPIRATION RATE: 16 BRPM | HEIGHT: 71 IN

## 2021-01-22 DIAGNOSIS — Z80.0 FAMILY HISTORY OF COLON CANCER: ICD-10-CM

## 2021-01-22 DIAGNOSIS — E78.1 HIGH BLOOD TRIGLYCERIDES: ICD-10-CM

## 2021-01-22 DIAGNOSIS — Z11.59 ENCOUNTER FOR HEPATITIS C SCREENING TEST FOR LOW RISK PATIENT: ICD-10-CM

## 2021-01-22 DIAGNOSIS — M10.9 ACUTE GOUTY ARTHRITIS: ICD-10-CM

## 2021-01-22 DIAGNOSIS — Z00.00 ROUTINE GENERAL MEDICAL EXAMINATION AT A HEALTH CARE FACILITY: Primary | ICD-10-CM

## 2021-01-22 DIAGNOSIS — I10 HTN, GOAL BELOW 140/80: ICD-10-CM

## 2021-01-22 LAB
ANION GAP SERPL CALCULATED.3IONS-SCNC: 5 MMOL/L (ref 3–14)
BUN SERPL-MCNC: 18 MG/DL (ref 7–30)
CALCIUM SERPL-MCNC: 8.7 MG/DL (ref 8.5–10.1)
CHLORIDE SERPL-SCNC: 101 MMOL/L (ref 94–109)
CHOLEST SERPL-MCNC: 223 MG/DL
CO2 SERPL-SCNC: 28 MMOL/L (ref 20–32)
CREAT SERPL-MCNC: 0.92 MG/DL (ref 0.66–1.25)
GFR SERPL CREATININE-BSD FRML MDRD: >90 ML/MIN/{1.73_M2}
GLUCOSE SERPL-MCNC: 104 MG/DL (ref 70–99)
HCV AB SERPL QL IA: NONREACTIVE
HDLC SERPL-MCNC: 29 MG/DL
LDLC SERPL CALC-MCNC: ABNORMAL MG/DL
LDLC SERPL DIRECT ASSAY-MCNC: 74 MG/DL
NONHDLC SERPL-MCNC: 194 MG/DL
POTASSIUM SERPL-SCNC: 3.9 MMOL/L (ref 3.4–5.3)
SODIUM SERPL-SCNC: 134 MMOL/L (ref 133–144)
TRIGL SERPL-MCNC: 1796 MG/DL
URATE SERPL-MCNC: 8 MG/DL (ref 3.5–7.2)

## 2021-01-22 PROCEDURE — 99214 OFFICE O/P EST MOD 30 MIN: CPT | Mod: 25 | Performed by: FAMILY MEDICINE

## 2021-01-22 PROCEDURE — 86803 HEPATITIS C AB TEST: CPT | Performed by: FAMILY MEDICINE

## 2021-01-22 PROCEDURE — 83721 ASSAY OF BLOOD LIPOPROTEIN: CPT | Mod: 59 | Performed by: FAMILY MEDICINE

## 2021-01-22 PROCEDURE — 84550 ASSAY OF BLOOD/URIC ACID: CPT | Performed by: FAMILY MEDICINE

## 2021-01-22 PROCEDURE — 80048 BASIC METABOLIC PNL TOTAL CA: CPT | Performed by: FAMILY MEDICINE

## 2021-01-22 PROCEDURE — 80061 LIPID PANEL: CPT | Performed by: FAMILY MEDICINE

## 2021-01-22 PROCEDURE — 99396 PREV VISIT EST AGE 40-64: CPT | Performed by: FAMILY MEDICINE

## 2021-01-22 PROCEDURE — 36415 COLL VENOUS BLD VENIPUNCTURE: CPT | Performed by: FAMILY MEDICINE

## 2021-01-22 RX ORDER — LISINOPRIL 2.5 MG/1
2.5 TABLET ORAL DAILY
Qty: 90 TABLET | Refills: 4 | Status: SHIPPED | OUTPATIENT
Start: 2021-01-22 | End: 2021-01-22

## 2021-01-22 RX ORDER — ATORVASTATIN CALCIUM 20 MG/1
20 TABLET, FILM COATED ORAL DAILY
Qty: 90 TABLET | Refills: 4 | Status: SHIPPED | OUTPATIENT
Start: 2021-01-22 | End: 2022-02-14

## 2021-01-22 RX ORDER — ALLOPURINOL 100 MG/1
200 TABLET ORAL DAILY
Qty: 180 TABLET | Refills: 4 | Status: SHIPPED | OUTPATIENT
Start: 2021-01-22 | End: 2022-02-14

## 2021-01-22 RX ORDER — LISINOPRIL 5 MG/1
5 TABLET ORAL DAILY
Qty: 90 TABLET | Refills: 4 | Status: SHIPPED | OUTPATIENT
Start: 2021-01-22 | End: 2022-02-14

## 2021-01-22 ASSESSMENT — MIFFLIN-ST. JEOR: SCORE: 1901.01

## 2021-01-22 NOTE — PROGRESS NOTES
SUBJECTIVE:   CC: Tommie Lema is an 40 year old male who presents for preventative health visit.     Patient has been advised of split billing requirements and indicates understanding: Yes  Healthy Habits:    Getting at least 3 servings of Calcium per day:  Yes    Bi-annual eye exam:  NO    Dental care twice a year:  Yes    Sleep apnea or symptoms of sleep apnea:  None    Diet:  Regular (no restrictions)    Frequency of exercise:  2-3 days/week    Duration of exercise:  30-45 minutes    Taking medications regularly:  Yes    Barriers to taking medications:  None    Medication side effects:  None    PHQ-2 Total Score:    Additional concerns today:  No        Hyperlipidemia Follow-Up      Are you regularly taking any medication or supplement to lower your cholesterol?   Yes- Atorvastatin    Are you having muscle aches or other side effects that you think could be caused by your cholesterol lowering medication?  No    Hypertension Follow-up      Do you check your blood pressure regularly outside of the clinic? Yes     Are you following a low salt diet? Yes    Are your blood pressures ever more than 140 on the top number (systolic) OR more   than 90 on the bottom number (diastolic), for example 140/90? Yes- sometimes but not too often. Patient states his lower number has been in the high 80's.  Sharp pains in the chest that come and go, very brief stabbing pain, comes on for no reason sitting or up and moving then resolves.  Did coronary calcium score which was 0.    Medication check for Allopurinol  Taking as prescribed: Yes  Side effects: None  Help sx: Yes. Patient states he takes this everyday and does pretty good with it but he is still having a little gout here and there. Wondering if he should increase his dose?    Today's PHQ-2 Score:   PHQ-2 ( 1999 Pfizer) 1/15/2020   Q1: Little interest or pleasure in doing things 0   Q2: Feeling down, depressed or hopeless 0   PHQ-2 Score 0       Abuse: Current or  "Past(Physical, Sexual or Emotional)- No  Do you feel safe in your environment? Yes        Social History     Tobacco Use     Smoking status: Never Smoker     Smokeless tobacco: Never Used   Substance Use Topics     Alcohol use: Yes     Comment: 1 drink/week     If you drink alcohol do you typically have >3 drinks per day or >7 drinks per week? No    No flowsheet data found.No flowsheet data found.    Last PSA: No results found for: PSA    Reviewed orders with patient. Reviewed health maintenance and updated orders accordingly - Yes  BP Readings from Last 3 Encounters:   01/22/21 (!) 124/94   01/15/20 112/82   01/10/20 (!) 132/96    Wt Readings from Last 3 Encounters:   01/22/21 96.9 kg (213 lb 9.6 oz)   01/15/20 95.7 kg (211 lb)   01/10/20 96.9 kg (213 lb 9.6 oz)                    Reviewed and updated as needed this visit by clinical staff  Tobacco  Allergies  Meds   Med Hx  Surg Hx  Fam Hx  Soc Hx        Reviewed and updated as needed this visit by Provider                    Review of Systems  CONSTITUTIONAL: NEGATIVE for fever, chills, change in weight  INTEGUMENTARY/SKIN: NEGATIVE for worrisome rashes, moles or lesions  EYES: NEGATIVE for vision changes or irritation  ENT: NEGATIVE for ear, mouth and throat problems  RESP: NEGATIVE for significant cough or SOB  CV: NEGATIVE for chest pain, palpitations or peripheral edema  GI: NEGATIVE for nausea, abdominal pain, heartburn, or change in bowel habits   male: negative for dysuria, hematuria, decreased urinary stream, erectile dysfunction, urethral discharge  MUSCULOSKELETAL: NEGATIVE for significant arthralgias or myalgia  NEURO: NEGATIVE for weakness, dizziness or paresthesias  PSYCHIATRIC: NEGATIVE for changes in mood or affect    OBJECTIVE:   /88   Pulse 71   Temp 97.6  F (36.4  C) (Tympanic)   Resp 16   Ht 1.803 m (5' 11\")   Wt 96.9 kg (213 lb 9.6 oz)   SpO2 98%   BMI 29.79 kg/m      Physical Exam  GENERAL: healthy, alert and no " distress  EYES: Eyes grossly normal to inspection, PERRL and conjunctivae and sclerae normal  HENT: ear canals and TM's normal, nose and mouth without ulcers or lesions  NECK: no adenopathy, no asymmetry, masses, or scars and thyroid normal to palpation  RESP: lungs clear to auscultation - no rales, rhonchi or wheezes  CV: regular rate and rhythm, normal S1 S2, no S3 or S4, no murmur, click or rub, no peripheral edema and peripheral pulses strong  ABDOMEN: soft, nontender, no hepatosplenomegaly, no masses and bowel sounds normal  MS: no gross musculoskeletal defects noted, no edema  SKIN: no suspicious lesions or rashes  NEURO: Normal strength and tone, mentation intact and speech normal  PSYCH: mentation appears normal, affect normal/bright    Diagnostic Test Results:  Labs reviewed in Epic    ASSESSMENT/PLAN:   Tommie was seen today for physical.    Diagnoses and all orders for this visit:    Routine general medical examination at a health care facility  -     Lipid panel reflex to direct LDL Non-fasting  -     Basic metabolic panel  (Ca, Cl, CO2, Creat, Gluc, K, Na, BUN)  -     Uric acid    Acute gouty arthritis: not in goal, plan to increase to 200mg allopurinol  -     allopurinol (ZYLOPRIM) 100 MG tablet; Take 2 tablets (200 mg) by mouth daily  -     Uric acid  -     **Uric acid FUTURE 2mo; Future    High blood triglycerides: stable, refill  -     atorvastatin (LIPITOR) 20 MG tablet; Take 1 tablet (20 mg) by mouth daily  -     Lipid panel reflex to direct LDL Non-fasting    HTN, goal below 140/80: not in goal, plan to increase to 5mg lisinopril from 2.5mg  -     Basic metabolic panel  (Ca, Cl, CO2, Creat, Gluc, K, Na, BUN)  -     lisinopril (ZESTRIL) 5 MG tablet; Take 1 tablet (5 mg) by mouth daily        Patient has been advised of split billing requirements and indicates understanding: Yes  COUNSELING:   Reviewed preventive health counseling, as reflected in patient instructions       Regular exercise        Healthy diet/nutrition       Vision screening       Hearing screening       Consider Hep C screening for all patients one time for ages 18-79 years    Wt Readings from Last 4 Encounters:   01/22/21 96.9 kg (213 lb 9.6 oz)   01/15/20 95.7 kg (211 lb)   01/10/20 96.9 kg (213 lb 9.6 oz)   11/06/18 91.7 kg (202 lb 3.2 oz)          Weight management plan: Discussed healthy diet and exercise guidelines    He reports that he has never smoked. He has never used smokeless tobacco.      Counseling Resources:  ATP IV Guidelines  Pooled Cohorts Equation Calculator  FRAX Risk Assessment  ICSI Preventive Guidelines  Dietary Guidelines for Americans, 2010  USDA's MyPlate  ASA Prophylaxis  Lung CA Screening    Paddy Salcido MD  [facility]

## 2021-01-22 NOTE — PATIENT INSTRUCTIONS
1. To lab    I set refills for the year.    For gout our goal is to keep the uric acid under 6 to prevent gout and inflammation from gout crystals in the body, so if you are having flares still, they we'll plan to increase the allopurinol to 200mg daily and then plan recheck lab for uric acid in 2-4 months. Do take Ibuprofen 2 times daily for the first 5 days of the increase.    Switching BP and cholesterol medications to bedtime, can work better in the body.  Increase the lisinopril to 5mg daily.      This is a preventative visit and any additional concerns or chronic disease management including medication refills addressed today could be charged additionally.    Preventative visits screen for diseases prior to they occur.  They do not cover for any new diagnosis or chronic disease management.     If you have questions regarding your coverage please check with your insurance provider.  At [place] we need to code correctly to be in compliance with all insurance companies.    Preventive Health Recommendations  Male Ages 40 to 49    Yearly exam:             See your health care provider every year in order to  o   Review health changes.   o   Discuss preventive care.    o   Review your medicines if your doctor has prescribed any.    You should be tested each year for STDs (sexually transmitted diseases) if you re at risk.     Have a cholesterol test every 5 years.     Have a colonoscopy (test for colon cancer) if someone in your family has had colon cancer or polyps before age 50.     After age 45, have a diabetes test (fasting glucose). If you are at risk for diabetes, you should have this test every 3 years.      Talk with your health care provider about whether or not a prostate cancer screening test (PSA) is right for you.    Shots: Get a flu shot each year. Get a tetanus shot every 10 years.     Nutrition:    Eat at least 5 servings of fruits and vegetables daily.     Eat whole-grain bread, whole-wheat pasta  and brown rice instead of white grains and rice.     Get adequate Calcium and Vitamin D.     Lifestyle    Exercise for at least 150 minutes a week (30 minutes a day, 5 days a week). This will help you control your weight and prevent disease.     Limit alcohol to one drink per day.     No smoking.     Wear sunscreen to prevent skin cancer.     See your dentist every six months for an exam and cleaning.

## 2021-09-12 ENCOUNTER — HEALTH MAINTENANCE LETTER (OUTPATIENT)
Age: 41
End: 2021-09-12

## 2021-12-13 NOTE — MR AVS SNAPSHOT
After Visit Summary   12/7/2017    Tommie Lema    MRN: [id number]           Patient Information     Date Of Birth          1980        Visit Information        Provider Department      12/7/2017 3:00 PM Steven Escobedo MD [facility]        Today's Diagnoses     Encounter for sterilization    -  1      Care Instructions          Thank you for choosing [facility].  You may be receiving a survey in the mail from [facility] regarding your visit today.  Please take a few minutes to complete and return the survey to let us know how we are doing.      If you have questions or concerns, please contact us via [employer] or you can contact your care team at [phone].    Our Clinic hours are:  Monday 6:40 am  to 7:00 pm  Tuesday -Friday 6:40 am to 5:00 pm    The [place] outpatient lab hours are:  Monday - Friday 6:10 am to 4:45 pm  Saturdays 7:00 am to 11:00 am  Appointments are required, call [phone]    If you have clinical questions after hours or would like to schedule an appointment,  call the clinic at [phone].            Follow-ups after your visit        Future tests that were ordered for you today     Open Future Orders        Priority Expected Expires Ordered    Semen Analysis Post Vasectomy Routine  3/29/2018 12/7/2017            Who to contact     If you have questions or need follow up information about today's clinic visit or your schedule please contact [facility] directly at [phone].  Normal or non-critical lab and imaging results will be communicated to you by MyChart, letter or phone within 4 business days after the clinic has received the results. If you do not hear from us within 7 days, please contact the clinic through [employer] or phone. If you have a critical or abnormal lab result, we will notify you by phone as soon as possible.  Submit refill requests through [facility] or call your pharmacy and they will forward the refill  ICC VISIT NOTE       Chely is a 50 year old female and is being seen in [facility] today on 12/13/2021    HPI  50-year-old female with chronic back problems presents with a left shoulder injury that occurred 3 days ago at work.  She said she was pulling some linens out and felt a sudden sharp pain in her shoulder.  She did report the injury at work.  She has significant pain if she tries to abduct her arm or reach for something above her head.  The pain is located around the shoulder itself and does not seem to radiate to the fingers.  No weakness or numbness.  No midline neck pain.  No cough or fever or congestion.  No vomiting or diarrhea.  No other trauma noted.      REVIEW OF SYSTEMS  Review of Systems   Constitutional: Negative for fever.   HENT: Negative for congestion.    Respiratory: Negative for cough.    Cardiovascular: Negative for chest pain.   Gastrointestinal: Negative for abdominal pain.   Genitourinary: Negative for dysuria.   Musculoskeletal: Negative for back pain.   Skin: Negative for rash.   Neurological: Negative for dizziness.   Hematological: Negative for adenopathy.       MEDICATIONS:  Current Outpatient Medications   Medication Sig   • methylPREDNISolone (MEDROL DOSEPAK) 4 MG tablet follow package directions   • cyclobenzaprine (FLEXERIL) 5 MG tablet Take 1 tablet by mouth 3 times daily as needed for Muscle spasms.   • HYDROcodone-acetaminophen (NORCO) 5-325 MG per tablet Take 1 tablet by mouth every 6 hours as needed for Pain.   • albuterol (Ventolin HFA) 108 (90 Base) MCG/ACT inhaler 2 puff(s)   • HYDROcodone-acetaminophen (Norco) 5-325 MG per tablet Take 1 tablet by mouth every 6 hours as needed for Pain.   • traZODone (DESYREL) 50 MG tablet Take 50 mg by mouth at bedtime as needed.   • medroxyPROGESTERone (PROVERA) 10 MG tablet medroxyprogesterone 10 mg tablet   • ALPRAZolam (XANAX) 1 MG tablet Take 1-2 mg by mouth daily as needed for Anxiety.    • escitalopram (LEXAPRO) 20 MG tablet      No  "request to us. Please allow 3 business days for your refill to be completed.          Additional Information About Your Visit        MyChart Information     Live [employer]ars lets you send messages to your doctor, view your test results, renew your prescriptions, schedule appointments and more. To sign up, go to [email or web address] . Click on \"Log in\" on the left side of the screen, which will take you to the Welcome page. Then click on \"Sign up Now\" on the right side of the page.     You will be asked to enter the access code listed below, as well as some personal information. Please follow the directions to create your username and password.     Your access code is: [email or web address]  Expires: 3/4/2018  2:24 PM     Your access code will  in 90 days. If you need help or a new code, please call your [facility] clinic or [phone].        Care EveryWhere ID     This is your [facility] EveryWhere ID. This could be used by other organizations to access your [facility] medical records  [id number]        Your Vitals Were     Pulse Temperature Height BMI (Body Mass Index)          84 98.1  F (36.7  C) (Tympanic) 5' 11.5\" (1.816 m) 26.13 kg/m2         Blood Pressure from Last 3 Encounters:   17 139/85   17 130/87   17 (!) 132/93    Weight from Last 3 Encounters:   17 190 lb (86.2 kg)   17 192 lb 9.6 oz (87.4 kg)   17 200 lb 12.8 oz (91.1 kg)              We Performed the Following     Surgical pathology exam     VASECTOMY UNILAT/BILAT W POSTOP SEMEN        Primary Care Provider Office Phone # Fax #    Paddy Salcido [phone] [address] [facility] [address]        Equal Access to Services     PEDRO GARZA : Viola Lambert, karely gonzalez, qasunil espinoza. [facility] [phone].    ATENCIÓN: Si habla español, tiene a joaquin disposición servicios gratuitos de asistencia lingüística. Llame al " current facility-administered medications for this visit.       ALLERGIES:  ALLERGIES:   Allergen Reactions   • Latex HIVES and Other (See Comments)   • Lidocaine HIVES   • Penicillins HIVES and Other (See Comments)   • Morphine Other (See Comments)   • Morphine Sulfate HIVES   • Penicillin G Other (See Comments)   • Tramadol Palpitations       PAST MEDICAL HISTORY:  Past Medical History:   Diagnosis Date   • Depression with anxiety 6/24/2019   • Low back pain 6/24/2019       PAST SURGICAL HISTORY:  Past Surgical History:   Procedure Laterality Date   • Laparoscopic cholecystectomy     • Spinal fusion       L5-S1 ALIF with Dr. Najera on 10/21/2016       FAMILY HISTORY:  History reviewed. No pertinent family history.    SOCIAL HISTORY:  Social History     Tobacco Use   • Smoking status: Former Smoker     Types: Cigarettes   • Smokeless tobacco: Never Used   Substance Use Topics   • Alcohol use: Yes     Comment: socially   • Drug use: Never       Patient's medications, allergies, past medical, surgical, and social history  were reviewed and updated as appropriate.    Vitals:    12/13/21 1633 12/13/21 1708   BP: (!) 140/90    BP Location: RUE - Right upper extremity    Patient Position: Sitting    Cuff Size: Large Adult    Pulse: 80    Resp: 18    Temp: 97.6 °F (36.4 °C)    TempSrc: Skin    SpO2: 99%    Weight: 97.5 kg (215 lb)    PainSc: 7  6    PainLoc: Shoulder    LMP: 02/24/2021      Physical Exam  HENT:      Head: Normocephalic.      Right Ear: External ear normal.      Left Ear: External ear normal.      Nose: Nose normal.      Mouth/Throat:      Mouth: Mucous membranes are moist.   Eyes:      Pupils: Pupils are equal, round, and reactive to light.   Cardiovascular:      Rate and Rhythm: Normal rate.      Pulses: Normal pulses.   Pulmonary:      Effort: Pulmonary effort is normal.   Abdominal:      Tenderness: There is no abdominal tenderness.   Musculoskeletal:      Cervical back: Normal range of motion.       Comments: Some left proximal shoulder tenderness.  Pain with abduction but she can almost get to 90 degrees.  No pain with internal/external rotation.  Distally neurovascular intact.  No scapular tenderness.  No C-spine tenderness.   Skin:     General: Skin is warm.      Capillary Refill: Capillary refill takes less than 2 seconds.   Neurological:      Mental Status: She is alert.           MDM:  Patient recently received a prescription for Flexeril from her spine surgeons.  I see the last opiate prescription she filled was about a year ago.  She said tramadol makes her heart race so I will give her a very small prescription for pain medication.  I explained he cannot give her any more Norco after this.  She does go to [facility] so she is happy to follow-up there.  I will give her sling for comfort but told her to do some subtle movements every day so she does not get a frozen shoulder.      POINT OF CARE TEST RESULTS  No results found for this visit on 12/13/21.    X-RAY:    Narrative & Impression   EXAM: XR SHOULDER 3 VIEWS LEFT     CLINICAL INDICATION: Left shoulder pain. No known injury.     COMPARISON: None.     FINDINGS:       Three views were obtained. There is no evidence of left shoulder fracture  or dislocation. Glenohumeral joint appears within normal limits. There is  no abnormal widening of the AC joint.     IMPRESSION:      No acute abnormality is identified.        Electronically Signed by: IDALIA CUMMINGS M.D.   Signed on: 12/13/2021 4:49 PM            DIAGNOSIS  Problem List Items Addressed This Visit     None      Visit Diagnoses     Strain of left shoulder, initial encounter    -  Primary    Relevant Medications    HYDROcodone-acetaminophen (NORCO) 5-325 MG per tablet    Other Relevant Orders    XR SHOULDER 3 VIEWS LEFT (Completed)    Splint Application          FOLLOW UP INSTRUCTIONS  Please follow up with your PCP Marquez Noble MD or proceed to ER for persistent, worsening or new  [phone].    We comply with applicable federal civil rights laws and [place] laws. We do not discriminate on the basis of race, color, national origin, age, disability, sex, sexual orientation, or gender identity.            Thank you!     Thank you for choosing [facility]  for your care. Our goal is always to provide you with excellent care. Hearing back from our patients is one way we can continue to improve our services. Please take a few minutes to complete the written survey that you may receive in the mail after your visit with us. Thank you!             Your Updated Medication List - Protect others around you: Learn how to safely use, store and throw away your medicines at www.disposemymeds.org.          This list is accurate as of: 12/7/17 11:59 PM.  Always use your most recent med list.                   Brand Name Dispense Instructions for use Diagnosis    ibuprofen 200 MG tablet    ADVIL/MOTRIN     Take 600 mg by mouth as needed. Indications: Mild to Moderate Pain        indomethacin 25 MG capsule    INDOCIN    42 capsule    Take 1 capsule (25 mg) by mouth 2 times daily (with meals)    Acute gout of left ankle, unspecified cause          symptoms.    Patient Instructions     Wear sling for comfort but make sure you do subtle arm movements every day to keep your arm from freezing up.  Continue your medications and use Norco only as needed.  No driving or drinking while taking Norco.  Read instructions and return for changes or worsening.  Follow-up with [facility] for further evaluation and possible additional testing.  Please follow up with:     [facility] [address] [address] [phone]         Patient Education     Treating Strains and Sprains  Strains and sprains happen when muscles or other soft tissues near your bones stretch or tear. These injuries can cause bruising, swelling, and pain. To ease your discomfort and speed the healing of your strain or sprain, follow the tips below. Remember, a strain or sprain can take 6 to 8 weeks to heal.     Important Note: Do not give aspirin to children or teens without discussing it with your healthcare provider first.        Ice first, heat later  · Use ice for the first 24 to 48 hours after injury. Ice helps prevent swelling and reduce pain. Ice the injury for no more than 20 minutes at a time and allow at least 20 minutes between icing sessions.  · Apply heat after the first 72 hours, once the swelling has gone down. Heat relaxes muscles and increases blood flow. Soak the injured area in warm water or use a heating pad set on low for no more than 15 minutes at a time.  Wrap and elevate  · Wrap an injured limb firmly with an elastic bandage. This provides support and helps prevent swelling. Don’t wear an elastic bandage overnight. Watch for tingling, numbness, or increased pain. Remove the bandage immediately if any of these occurs.  · Elevate the injured area to help reduce swelling and throbbing. It’s best to raise an injured limb above the level of your heart.     Medicines  · Over-the-counter medicines such as acetaminophen or ibuprofen can help reduce pain.  Some also help reduce swelling.  · Take medicine only as directed.  · Rest the area even if medicines are controlling the pain.  Rest  · Rest the injured area by not using it for 24 hours.  · When you’re ready, return slowly to your normal activities. Rest the injured area often.  · Don’t use or walk on an injured limb if it hurts.  Date Last Reviewed: 1/1/2018  © 3477-7812 [employer]. [address] [place] [address], [place] [address]. All rights reserved. This information is not intended as a substitute for professional medical care. Always follow your healthcare professional's instructions.               Instructions provided as documented in the AVS.

## 2022-02-10 DIAGNOSIS — I10 HTN, GOAL BELOW 140/80: ICD-10-CM

## 2022-02-10 DIAGNOSIS — M10.9 ACUTE GOUTY ARTHRITIS: ICD-10-CM

## 2022-02-10 DIAGNOSIS — E78.1 HIGH BLOOD TRIGLYCERIDES: ICD-10-CM

## 2022-02-11 NOTE — TELEPHONE ENCOUNTER
"Requested Prescriptions   Pending Prescriptions Disp Refills     atorvastatin (LIPITOR) 20 MG tablet [Pharmacy Med Name: ATORVASTATIN CALCIUM 20MG TABS] 90 tablet 4     Sig: TAKE 1 TABLET (20 MG) BY MOUTH DAILY       Statins Protocol Failed - 2/10/2022  6:30 AM        Failed - LDL on file in past 12 months     Recent Labs   Lab Test 01/22/21  0752   LDL Cannot estimate LDL when triglyceride exceeds 400 mg/dL  74             Failed - Recent (12 mo) or future (30 days) visit within the authorizing provider's specialty     Patient has had an office visit with the authorizing provider or a provider within the authorizing providers department within the previous 12 mos or has a future within next 30 days. See \"Patient Info\" tab in inbasket, or \"Choose Columns\" in Meds & Orders section of the refill encounter.              Passed - No abnormal creatine kinase in past 12 months     No lab results found.             Passed - Medication is active on med list        Passed - Patient is age 18 or older           allopurinol (ZYLOPRIM) 100 MG tablet [Pharmacy Med Name: ALLOPURINOL 100MG TABS] 180 tablet 4     Sig: TAKE 2 TABLETS (200 MG) BY MOUTH DAILY       Gout Agents Protocol Failed - 2/10/2022  6:30 AM        Failed - CBC on file in past 12 months     No lab results found.              Failed - ALT on file in past 12 months     No lab results found.          Failed - Has Uric Acid on file in past 12 months and value is less than 6     Recent Labs   Lab Test 01/22/21  0752   URIC 8.0*     If level is 6mg/dL or greater, ok to refill one time and refer to provider.           Failed - Recent (12 mo) or future (30 days) visit within the authorizing provider's specialty     Patient has had an office visit with the authorizing provider or a provider within the authorizing providers department within the previous 12 mos or has a future within next 30 days. See \"Patient Info\" tab in inbasket, or \"Choose Columns\" in Meds & Orders " "section of the refill encounter.              Failed - Normal serum creatinine on file in the past 12 months     Recent Labs   Lab Test 01/22/21  0752   CR 0.92       Ok to refill medication if creatinine is low          Passed - Medication is active on med list        Passed - Patient is age 18 or older           lisinopril (ZESTRIL) 5 MG tablet [Pharmacy Med Name: LISINOPRIL 5MG TABS] 90 tablet 4     Sig: TAKE 1 TABLET (5 MG) BY MOUTH DAILY       ACE Inhibitors (Including Combos) Protocol Failed - 2/10/2022  6:30 AM        Failed - Blood pressure under 140/90 in past 12 months     BP Readings from Last 3 Encounters:   01/22/21 122/88   01/15/20 112/82   01/10/20 (!) 132/96                 Failed - Recent (12 mo) or future (30 days) visit within the authorizing provider's specialty     Patient has had an office visit with the authorizing provider or a provider within the authorizing providers department within the previous 12 mos or has a future within next 30 days. See \"Patient Info\" tab in inbasket, or \"Choose Columns\" in Meds & Orders section of the refill encounter.              Failed - Normal serum creatinine on file in past 12 months     Recent Labs   Lab Test 01/22/21  0752   CR 0.92       Ok to refill medication if creatinine is low          Failed - Normal serum potassium on file in past 12 months     Recent Labs   Lab Test 01/22/21  0752   POTASSIUM 3.9             Passed - Medication is active on med list        Passed - Patient is age 18 or older             "

## 2022-02-14 RX ORDER — LISINOPRIL 5 MG/1
5 TABLET ORAL DAILY
Qty: 90 TABLET | Refills: 0 | Status: SHIPPED | OUTPATIENT
Start: 2022-02-14 | End: 2022-05-27

## 2022-02-14 RX ORDER — ALLOPURINOL 100 MG/1
200 TABLET ORAL DAILY
Qty: 180 TABLET | Refills: 0 | Status: SHIPPED | OUTPATIENT
Start: 2022-02-14 | End: 2022-05-27

## 2022-02-14 RX ORDER — ATORVASTATIN CALCIUM 20 MG/1
20 TABLET, FILM COATED ORAL DAILY
Qty: 90 TABLET | Refills: 0 | Status: SHIPPED | OUTPATIENT
Start: 2022-02-14 | End: 2022-05-27

## 2022-02-14 NOTE — TELEPHONE ENCOUNTER
LM on patient VM to call Care Team for a message from Dr. Salcido. Stephy Paul on 2/14/2022 at 11:31 AM

## 2022-02-14 NOTE — TELEPHONE ENCOUNTER
Sent 90 days.  Notify patient will need appt with fasting labs for refills.  Thank you,  Paddy Salcido MD

## 2022-02-15 ENCOUNTER — TELEPHONE (OUTPATIENT)
Dept: FAMILY MEDICINE | Facility: CLINIC | Age: 42
End: 2022-02-15
Payer: COMMERCIAL

## 2022-02-27 ENCOUNTER — HEALTH MAINTENANCE LETTER (OUTPATIENT)
Age: 42
End: 2022-02-27

## 2022-05-27 ENCOUNTER — OFFICE VISIT (OUTPATIENT)
Dept: FAMILY MEDICINE | Facility: CLINIC | Age: 42
End: 2022-05-27
Payer: COMMERCIAL

## 2022-05-27 VITALS
DIASTOLIC BLOOD PRESSURE: 70 MMHG | HEART RATE: 77 BPM | HEIGHT: 71 IN | WEIGHT: 213.8 LBS | BODY MASS INDEX: 29.93 KG/M2 | SYSTOLIC BLOOD PRESSURE: 110 MMHG | RESPIRATION RATE: 12 BRPM | OXYGEN SATURATION: 97 % | TEMPERATURE: 97.2 F

## 2022-05-27 DIAGNOSIS — E78.1 HIGH BLOOD TRIGLYCERIDES: ICD-10-CM

## 2022-05-27 DIAGNOSIS — Z23 HIGH PRIORITY FOR 2019-NCOV VACCINE: ICD-10-CM

## 2022-05-27 DIAGNOSIS — M10.9 ACUTE GOUTY ARTHRITIS: ICD-10-CM

## 2022-05-27 DIAGNOSIS — I10 HTN, GOAL BELOW 140/80: Primary | ICD-10-CM

## 2022-05-27 LAB
ANION GAP SERPL CALCULATED.3IONS-SCNC: 7 MMOL/L (ref 3–14)
BUN SERPL-MCNC: 16 MG/DL (ref 7–30)
CALCIUM SERPL-MCNC: 9.4 MG/DL (ref 8.5–10.1)
CHLORIDE BLD-SCNC: 100 MMOL/L (ref 94–109)
CHOLEST SERPL-MCNC: 226 MG/DL
CO2 SERPL-SCNC: 29 MMOL/L (ref 20–32)
CREAT SERPL-MCNC: 0.95 MG/DL (ref 0.66–1.25)
FASTING STATUS PATIENT QL REPORTED: YES
GFR SERPL CREATININE-BSD FRML MDRD: >90 ML/MIN/1.73M2
GLUCOSE BLD-MCNC: 91 MG/DL (ref 70–99)
HBA1C MFR BLD: 4.9 % (ref 0–5.6)
HDLC SERPL-MCNC: 35 MG/DL
LDLC SERPL CALC-MCNC: 55 MG/DL
LDLC SERPL CALC-MCNC: ABNORMAL MG/DL
NONHDLC SERPL-MCNC: 191 MG/DL
POTASSIUM BLD-SCNC: 3.9 MMOL/L (ref 3.4–5.3)
SODIUM SERPL-SCNC: 136 MMOL/L (ref 133–144)
TRIGL SERPL-MCNC: 886 MG/DL
URATE SERPL-MCNC: 7.2 MG/DL (ref 3.5–7.2)

## 2022-05-27 PROCEDURE — 83036 HEMOGLOBIN GLYCOSYLATED A1C: CPT | Performed by: FAMILY MEDICINE

## 2022-05-27 PROCEDURE — 80048 BASIC METABOLIC PNL TOTAL CA: CPT | Performed by: FAMILY MEDICINE

## 2022-05-27 PROCEDURE — 36415 COLL VENOUS BLD VENIPUNCTURE: CPT | Performed by: FAMILY MEDICINE

## 2022-05-27 PROCEDURE — 99214 OFFICE O/P EST MOD 30 MIN: CPT | Mod: 25 | Performed by: FAMILY MEDICINE

## 2022-05-27 PROCEDURE — 91305 COVID-19,PF,PFIZER (12+ YRS): CPT | Performed by: FAMILY MEDICINE

## 2022-05-27 PROCEDURE — 80061 LIPID PANEL: CPT | Performed by: FAMILY MEDICINE

## 2022-05-27 PROCEDURE — 83721 ASSAY OF BLOOD LIPOPROTEIN: CPT | Mod: 59 | Performed by: FAMILY MEDICINE

## 2022-05-27 PROCEDURE — 0054A COVID-19,PF,PFIZER (12+ YRS): CPT | Performed by: FAMILY MEDICINE

## 2022-05-27 PROCEDURE — 84550 ASSAY OF BLOOD/URIC ACID: CPT | Performed by: FAMILY MEDICINE

## 2022-05-27 RX ORDER — LISINOPRIL 5 MG/1
5 TABLET ORAL DAILY
Qty: 90 TABLET | Refills: 0 | Status: SHIPPED | OUTPATIENT
Start: 2022-05-27 | End: 2022-08-18

## 2022-05-27 RX ORDER — ATORVASTATIN CALCIUM 20 MG/1
20 TABLET, FILM COATED ORAL DAILY
Qty: 90 TABLET | Refills: 0 | Status: SHIPPED | OUTPATIENT
Start: 2022-05-27 | End: 2022-08-18

## 2022-05-27 RX ORDER — ALLOPURINOL 100 MG/1
200 TABLET ORAL DAILY
Qty: 180 TABLET | Refills: 0 | Status: SHIPPED | OUTPATIENT
Start: 2022-05-27 | End: 2022-06-02

## 2022-05-27 ASSESSMENT — PAIN SCALES - GENERAL: PAINLEVEL: NO PAIN (0)

## 2022-05-27 NOTE — PROGRESS NOTES
"  Assessment & Plan     Acute gouty arthritis  - allopurinol (ZYLOPRIM) 100 MG tablet  Dispense: 180 tablet; Refill: 0  - Uric acid  - Uric acid    High blood triglycerides  - atorvastatin (LIPITOR) 20 MG tablet  Dispense: 90 tablet; Refill: 0  - Lipid panel reflex to direct LDL Fasting  - Hemoglobin A1c  - LDL cholesterol direct  - Lipid panel reflex to direct LDL Fasting  - Hemoglobin A1c  - LDL cholesterol direct    HTN, goal below 140/80  - lisinopril (ZESTRIL) 5 MG tablet  Dispense: 90 tablet; Refill: 0  - Basic metabolic panel  (Ca, Cl, CO2, Creat, Gluc, K, Na, BUN)  - Basic metabolic panel  (Ca, Cl, CO2, Creat, Gluc, K, Na, BUN)    High priority for 2019-nCoV vaccine  - COVID-19,PF,PFIZER (12+ Yrs GRAY LABEL)         BMI:   Estimated body mass index is 29.82 kg/m  as calculated from the following:    Height as of this encounter: 1.803 m (5' 11\").    Weight as of this encounter: 97 kg (213 lb 12.8 oz).   Weight management plan: Discussed healthy diet and exercise guidelines        No follow-ups on file.    NORA MIX [facility]    Yoana Mckeon is a 42 year old who presents for the following health issues     History of Present Illness       Hyperlipidemia:  He presents for follow up of hyperlipidemia.  He is taking medication to lower cholesterol. He is not having myalgia or other side effects to statin medications.    Hypertension: He presents for follow up of hypertension.  He does check blood pressure  regularly outside of the clinic. Outside blood pressures have been over 140/90. (occasionally, depending on time of day) He follows a low salt diet.     Reason for visit:  Med check    He eats 2-3 servings of fruits and vegetables daily.He consumes 0 sweetened beverage(s) daily.He exercises with enough effort to increase his heart rate 10 to 19 minutes per day.  He exercises with enough effort to increase his heart rate 3 or less days per week.   He is taking " "medications regularly.     Would like labs done, is fasting.    HTN: Lisinopril 5 mg takes medication, BP readings at home 130/90. He uses arm cuff. He is a marathon runner.    HLD: atorvastatin, taking daily. Tries to get balanced diet.    HX of Gout: no attacks within the last year.    Review of Systems   Constitutional, HEENT, cardiovascular, pulmonary, gi and gu systems are negative, except as otherwise noted.      Objective    /70 (BP Location: Right arm, Patient Position: Chair, Cuff Size: Adult Large)   Pulse 77   Temp 97.2  F (36.2  C) (Tympanic)   Resp 12   Ht 1.803 m (5' 11\")   Wt 97 kg (213 lb 12.8 oz)   SpO2 97%   BMI 29.82 kg/m    Body mass index is 29.82 kg/m .  Physical Exam  Constitutional:       General: He is not in acute distress.     Appearance: He is well-developed.   HENT:      Right Ear: Tympanic membrane and external ear normal.      Left Ear: Tympanic membrane and external ear normal.      Nose: Nose normal.      Mouth/Throat:      Mouth: No oral lesions.      Pharynx: No oropharyngeal exudate.   Eyes:      General:         Right eye: No discharge.         Left eye: No discharge.      Conjunctiva/sclera: Conjunctivae normal.      Pupils: Pupils are equal, round, and reactive to light.   Neck:      Thyroid: No thyromegaly.      Trachea: No tracheal deviation.   Cardiovascular:      Rate and Rhythm: Normal rate and regular rhythm.      Pulses: Normal pulses.      Heart sounds: Normal heart sounds, S1 normal and S2 normal. No murmur heard.    No S3 or S4 sounds.   Pulmonary:      Effort: Pulmonary effort is normal. No respiratory distress.      Breath sounds: Normal breath sounds. No wheezing or rales.   Abdominal:      General: Bowel sounds are normal.      Palpations: Abdomen is soft. There is no mass.      Tenderness: There is no abdominal tenderness.   Musculoskeletal:         General: No deformity. Normal range of motion.      Cervical back: Neck supple.   Lymphadenopathy:     "  Cervical: No cervical adenopathy.   Skin:     General: Skin is warm and dry.      Findings: No lesion or rash.   Neurological:      Mental Status: He is alert and oriented to person, place, and time.      Motor: No abnormal muscle tone.      Deep Tendon Reflexes: Reflexes are normal and symmetric.   Psychiatric:         Speech: Speech normal.         Thought Content: Thought content normal.         Judgment: Judgment normal.

## 2022-06-02 RX ORDER — ALLOPURINOL 100 MG/1
300 TABLET ORAL DAILY
Qty: 180 TABLET | Refills: 4 | Status: SHIPPED | OUTPATIENT
Start: 2022-06-02 | End: 2023-08-01

## 2022-06-02 RX ORDER — FENOFIBRATE 54 MG/1
54 TABLET ORAL DAILY
Qty: 30 TABLET | Refills: 11 | Status: SHIPPED | OUTPATIENT
Start: 2022-06-02 | End: 2023-05-24

## 2022-11-19 ENCOUNTER — HEALTH MAINTENANCE LETTER (OUTPATIENT)
Age: 42
End: 2022-11-19

## 2023-04-09 ENCOUNTER — HEALTH MAINTENANCE LETTER (OUTPATIENT)
Age: 43
End: 2023-04-09

## 2023-05-24 DIAGNOSIS — I10 HTN, GOAL BELOW 140/80: ICD-10-CM

## 2023-05-24 DIAGNOSIS — E78.1 HIGH BLOOD TRIGLYCERIDES: ICD-10-CM

## 2023-05-24 RX ORDER — LISINOPRIL 5 MG/1
5 TABLET ORAL DAILY
Qty: 90 TABLET | Refills: 1 | Status: SHIPPED | OUTPATIENT
Start: 2023-05-24 | End: 2023-12-01

## 2023-05-24 RX ORDER — ATORVASTATIN CALCIUM 20 MG/1
20 TABLET, FILM COATED ORAL DAILY
Qty: 90 TABLET | Refills: 1 | Status: SHIPPED | OUTPATIENT
Start: 2023-05-24 | End: 2023-12-01

## 2023-05-24 RX ORDER — FENOFIBRATE 54 MG/1
54 TABLET ORAL DAILY
Qty: 90 TABLET | Refills: 1 | Status: SHIPPED | OUTPATIENT
Start: 2023-05-24 | End: 2023-12-01

## 2023-08-01 ENCOUNTER — TELEPHONE (OUTPATIENT)
Dept: FAMILY MEDICINE | Facility: CLINIC | Age: 43
End: 2023-08-01
Payer: COMMERCIAL

## 2023-08-01 DIAGNOSIS — M10.9 ACUTE GOUTY ARTHRITIS: ICD-10-CM

## 2023-08-01 RX ORDER — ALLOPURINOL 100 MG/1
300 TABLET ORAL DAILY
Qty: 180 TABLET | Refills: 0 | Status: SHIPPED | OUTPATIENT
Start: 2023-08-01 | End: 2023-10-31

## 2023-08-01 NOTE — TELEPHONE ENCOUNTER
"Requested Prescriptions   Pending Prescriptions Disp Refills    allopurinol (ZYLOPRIM) 100 MG tablet [Pharmacy Med Name: ALLOPURINOL 100MG TABS] 180 tablet 4     Sig: TAKE 3 TABLETS (300 MG) BY MOUTH DAILY       Gout Agents Protocol Failed - 8/1/2023 10:15 AM        Failed - CBC on file in past 12 months     No lab results found.              Failed - ALT on file in past 12 months     No lab results found.          Failed - Has Uric Acid on file in past 12 months and value is less than 6     Recent Labs   Lab Test 05/27/22  1403   URIC 7.2     If level is 6mg/dL or greater, ok to refill one time and refer to provider.           Failed - Recent (12 mo) or future (30 days) visit within the authorizing provider's specialty     Patient has had an office visit with the authorizing provider or a provider within the authorizing providers department within the previous 12 mos or has a future within next 30 days. See \"Patient Info\" tab in inbasket, or \"Choose Columns\" in Meds & Orders section of the refill encounter.              Failed - Normal serum creatinine on file in the past 12 months     Recent Labs   Lab Test 05/27/22  1403   CR 0.95       Ok to refill medication if creatinine is low          Passed - Medication is active on med list        Passed - Patient is age 18 or older             "

## 2023-08-01 NOTE — TELEPHONE ENCOUNTER
Pharmacy reports patient is on his way to  medication and he wants this filled ASAP.    Routing to provider as urgent.    Lisa Sosa RN on 8/1/2023 at 4:11 PM

## 2023-08-01 NOTE — LETTER
[facility]  [address]  [facility] [address]  [phone]  August 2, 2023    Tommie Lema  [address] [address]    Dear Tommie,    We care about your health and have reviewed your health plan including your medical conditions, medication list, and lab results.  Based on this review, it is recommended that you follow up regarding the following health topic(s):     -Wellness (Physical) Visit - Appointment needed for ANY further medication refills.    Please call us at [phone] (or use [email or web address]) to schedule an appt to address the above recommendations.     Thank you for trusting [facility] and we appreciate the opportunity to serve you.  We look forward to supporting your healthcare needs in the future.    Healthy Regards,      Your Health Care Team  [facility]

## 2023-12-01 DIAGNOSIS — I10 HTN, GOAL BELOW 140/80: ICD-10-CM

## 2023-12-01 DIAGNOSIS — E78.1 HIGH BLOOD TRIGLYCERIDES: ICD-10-CM

## 2023-12-01 RX ORDER — LISINOPRIL 5 MG/1
5 TABLET ORAL DAILY
Qty: 90 TABLET | Refills: 0 | Status: SHIPPED | OUTPATIENT
Start: 2023-12-01 | End: 2024-01-05

## 2023-12-01 RX ORDER — ATORVASTATIN CALCIUM 20 MG/1
20 TABLET, FILM COATED ORAL DAILY
Qty: 90 TABLET | Refills: 0 | Status: SHIPPED | OUTPATIENT
Start: 2023-12-01 | End: 2024-01-05

## 2023-12-01 RX ORDER — LISINOPRIL 5 MG/1
5 TABLET ORAL DAILY
Qty: 90 TABLET | Refills: 1 | OUTPATIENT
Start: 2023-12-01

## 2023-12-01 RX ORDER — FENOFIBRATE 54 MG/1
54 TABLET ORAL DAILY
Qty: 90 TABLET | Refills: 0 | Status: SHIPPED | OUTPATIENT
Start: 2023-12-01 | End: 2024-01-05

## 2023-12-01 NOTE — TELEPHONE ENCOUNTER
Pending Prescriptions:                       Disp   Refills    atorvastatin (LIPITOR) 20 MG tablet       90 tab*             Sig: Take 1 tablet (20 mg) by mouth daily    fenofibrate (LOFIBRA) 54 MG tablet        90 tab*             Sig: Take 1 tablet (54 mg) by mouth daily    lisinopril (ZESTRIL) 5 MG tablet          90 tab*             Sig: Take 1 tablet (5 mg) by mouth daily      Jan 05, 2024  3:00 PM  (Arrive by 2:40 PM)  Adult Preventative Visit with DO JAMES [facility] ([facility] ) [address] [facility] [id number] [phone]

## 2023-12-01 NOTE — TELEPHONE ENCOUNTER
"Requested Prescriptions   Pending Prescriptions Disp Refills    atorvastatin (LIPITOR) 20 MG tablet 90 tablet      Sig: Take 1 tablet (20 mg) by mouth daily       Statins Protocol Failed - 12/1/2023 10:27 AM        Failed - LDL on file in past 12 months     Recent Labs   Lab Test 05/27/22  1403   LDL 55             Failed - Recent (12 mo) or future (30 days) visit within the authorizing provider's specialty     Patient has had an office visit with the authorizing provider or a provider within the authorizing providers department within the previous 12 mos or has a future within next 30 days. See \"Patient Info\" tab in inbasket, or \"Choose Columns\" in Meds & Orders section of the refill encounter.              Passed - No abnormal creatine kinase in past 12 months     No lab results found.             Passed - Medication is active on med list        Passed - Patient is age 18 or older          fenofibrate (LOFIBRA) 54 MG tablet 90 tablet      Sig: Take 1 tablet (54 mg) by mouth daily       Fibrates Failed - 12/1/2023 10:27 AM        Failed - Lipid panel on file in past 12 months     Recent Labs   Lab Test 05/27/22  1403   CHOL 226*   TRIG 886*   HDL 35*   LDL 55   NHDL 191*               Failed - Recent (12 mo) or future (30 days) visit within the authorizing provider's specialty     Patient has had an office visit with the authorizing provider or a provider within the authorizing providers department within the previous 12 mos or has a future within next 30 days. See \"Patient Info\" tab in inbasket, or \"Choose Columns\" in Meds & Orders section of the refill encounter.              Passed - No abnormal creatine kinase in past 12 months     No lab results found.             Passed - Medication is active on med list        Passed - Patient is age 18 or older          lisinopril (ZESTRIL) 5 MG tablet 90 tablet      Sig: Take 1 tablet (5 mg) by mouth daily       ACE Inhibitors (Including Combos) Protocol Failed - 12/1/2023 " "10:27 AM        Failed - Blood pressure under 140/90 in past 12 months     BP Readings from Last 3 Encounters:   05/27/22 110/70   01/22/21 122/88   01/15/20 112/82                 Failed - Recent (12 mo) or future (30 days) visit within the authorizing provider's specialty     Patient has had an office visit with the authorizing provider or a provider within the authorizing providers department within the previous 12 mos or has a future within next 30 days. See \"Patient Info\" tab in inbasket, or \"Choose Columns\" in Meds & Orders section of the refill encounter.              Failed - Normal serum creatinine on file in past 12 months     Recent Labs   Lab Test 05/27/22  1403   CR 0.95       Ok to refill medication if creatinine is low          Failed - Normal serum potassium on file in past 12 months     Recent Labs   Lab Test 05/27/22  1403   POTASSIUM 3.9             Passed - Medication is active on med list        Passed - Patient is age 18 or older           Patient has Office Visit for Annual Wellness Visit with Dr. Naranjo 1/5/23.    Lisa Sosa RN on 12/1/2023 at 11:17 AM    "

## 2024-01-05 ENCOUNTER — OFFICE VISIT (OUTPATIENT)
Dept: FAMILY MEDICINE | Facility: CLINIC | Age: 44
End: 2024-01-05
Payer: COMMERCIAL

## 2024-01-05 ENCOUNTER — LAB (OUTPATIENT)
Dept: LAB | Facility: CLINIC | Age: 44
End: 2024-01-05
Payer: COMMERCIAL

## 2024-01-05 VITALS
OXYGEN SATURATION: 97 % | WEIGHT: 219 LBS | BODY MASS INDEX: 30.66 KG/M2 | RESPIRATION RATE: 16 BRPM | DIASTOLIC BLOOD PRESSURE: 70 MMHG | HEART RATE: 77 BPM | HEIGHT: 71 IN | SYSTOLIC BLOOD PRESSURE: 116 MMHG

## 2024-01-05 DIAGNOSIS — M10.9 ACUTE GOUTY ARTHRITIS: ICD-10-CM

## 2024-01-05 DIAGNOSIS — Z80.0 FAMILY HX OF COLORECTAL CANCER: ICD-10-CM

## 2024-01-05 DIAGNOSIS — E78.1 HIGH BLOOD TRIGLYCERIDES: ICD-10-CM

## 2024-01-05 DIAGNOSIS — Z82.49 FAMILY HISTORY OF ISCHEMIC HEART DISEASE: ICD-10-CM

## 2024-01-05 DIAGNOSIS — I10 HTN, GOAL BELOW 140/80: ICD-10-CM

## 2024-01-05 DIAGNOSIS — Z00.00 ROUTINE GENERAL MEDICAL EXAMINATION AT A HEALTH CARE FACILITY: Primary | ICD-10-CM

## 2024-01-05 LAB
ANION GAP SERPL CALCULATED.3IONS-SCNC: 14 MMOL/L (ref 7–15)
BUN SERPL-MCNC: 16.7 MG/DL (ref 6–20)
CALCIUM SERPL-MCNC: 9.9 MG/DL (ref 8.6–10)
CHLORIDE SERPL-SCNC: 101 MMOL/L (ref 98–107)
CHOLEST SERPL-MCNC: 221 MG/DL
CREAT SERPL-MCNC: 1.02 MG/DL (ref 0.67–1.17)
DEPRECATED HCO3 PLAS-SCNC: 25 MMOL/L (ref 22–29)
EGFRCR SERPLBLD CKD-EPI 2021: >90 ML/MIN/1.73M2
FASTING STATUS PATIENT QL REPORTED: YES
GLUCOSE SERPL-MCNC: 84 MG/DL (ref 70–99)
HDLC SERPL-MCNC: 32 MG/DL
LDLC SERPL CALC-MCNC: ABNORMAL MG/DL
NONHDLC SERPL-MCNC: 189 MG/DL
POTASSIUM SERPL-SCNC: 3.9 MMOL/L (ref 3.4–5.3)
SODIUM SERPL-SCNC: 140 MMOL/L (ref 135–145)
TRIGL SERPL-MCNC: 431 MG/DL
URATE SERPL-MCNC: 7.4 MG/DL (ref 3.4–7)

## 2024-01-05 PROCEDURE — 80061 LIPID PANEL: CPT

## 2024-01-05 PROCEDURE — 36415 COLL VENOUS BLD VENIPUNCTURE: CPT

## 2024-01-05 PROCEDURE — 83721 ASSAY OF BLOOD LIPOPROTEIN: CPT | Mod: 59

## 2024-01-05 PROCEDURE — 80048 BASIC METABOLIC PNL TOTAL CA: CPT

## 2024-01-05 PROCEDURE — 84550 ASSAY OF BLOOD/URIC ACID: CPT

## 2024-01-05 PROCEDURE — 99396 PREV VISIT EST AGE 40-64: CPT | Performed by: FAMILY MEDICINE

## 2024-01-05 PROCEDURE — 99214 OFFICE O/P EST MOD 30 MIN: CPT | Mod: 25 | Performed by: FAMILY MEDICINE

## 2024-01-05 RX ORDER — ALLOPURINOL 100 MG/1
200 TABLET ORAL DAILY
Qty: 180 TABLET | Refills: 3 | Status: SHIPPED | OUTPATIENT
Start: 2024-01-05 | End: 2024-01-10

## 2024-01-05 RX ORDER — ATORVASTATIN CALCIUM 20 MG/1
20 TABLET, FILM COATED ORAL DAILY
Qty: 90 TABLET | Refills: 3 | Status: SHIPPED | OUTPATIENT
Start: 2024-01-05

## 2024-01-05 RX ORDER — FENOFIBRATE 54 MG/1
54 TABLET ORAL DAILY
Qty: 90 TABLET | Refills: 3 | Status: SHIPPED | OUTPATIENT
Start: 2024-01-05

## 2024-01-05 RX ORDER — LISINOPRIL 5 MG/1
5 TABLET ORAL DAILY
Qty: 90 TABLET | Refills: 3 | Status: SHIPPED | OUTPATIENT
Start: 2024-01-05

## 2024-01-05 RX ORDER — ALLOPURINOL 300 MG/1
300 TABLET ORAL DAILY
Qty: 90 TABLET | Refills: 3 | Status: CANCELLED | OUTPATIENT
Start: 2024-01-05

## 2024-01-05 RX ORDER — ALLOPURINOL 100 MG/1
300 TABLET ORAL DAILY
Qty: 180 TABLET | Refills: 0 | Status: CANCELLED | OUTPATIENT
Start: 2024-01-05

## 2024-01-05 ASSESSMENT — ENCOUNTER SYMPTOMS
SORE THROAT: 0
HEMATOCHEZIA: 0
SHORTNESS OF BREATH: 0
EYE PAIN: 0
DYSURIA: 0
PALPITATIONS: 0
NAUSEA: 0
CONSTIPATION: 0
HEMATURIA: 0
ARTHRALGIAS: 0
JOINT SWELLING: 0
HEADACHES: 0
DIARRHEA: 0
DIZZINESS: 0
CHILLS: 0
FREQUENCY: 0
COUGH: 0
HEARTBURN: 0
ABDOMINAL PAIN: 0
MYALGIAS: 0
FEVER: 0
WEAKNESS: 0
NERVOUS/ANXIOUS: 0
PARESTHESIAS: 0

## 2024-01-05 ASSESSMENT — PAIN SCALES - GENERAL: PAINLEVEL: NO PAIN (0)

## 2024-01-05 NOTE — PROGRESS NOTES
SUBJECTIVE:   Mike is a 43 year old, presenting for the following:  Physical        1/5/2024     2:33 PM   Additional Questions   Roomed by Misa VARGAS MA   Accompanied by Self       Healthy Habits:     Getting at least 3 servings of Calcium per day:  Yes    Bi-annual eye exam:  NO    Dental care twice a year:  Yes    Sleep apnea or symptoms of sleep apnea:  None    Diet:  Regular (no restrictions)    Frequency of exercise:  1 day/week    Duration of exercise:  15-30 minutes    Taking medications regularly:  Yes    Medication side effects:  None    Additional concerns today:  No    BP: 120/80    Today's PHQ-2 Score:       1/5/2024     2:22 PM   PHQ-2 ( 1999 [employer])   Q1: Little interest or pleasure in doing things 0   Q2: Feeling down, depressed or hopeless 0   PHQ-2 Score 0   Q1: Little interest or pleasure in doing things Not at all   Q2: Feeling down, depressed or hopeless Not at all   PHQ-2 Score 0     Hypertension Follow-up    Do you check your blood pressure regularly outside of the clinic? Yes   Are you following a low salt diet? No  Are your blood pressures ever more than 140 on the top number (systolic) OR more   than 90 on the bottom number (diastolic), for example 140/90? No    Medication Followup of Allopurinol  Taking Medication as prescribed: NO-Only taking 200 mg, takes extra when he needs it  Side Effects:  None  Medication Helping Symptoms:  yes  Mother diagnosed with colon cancer in his late 60s.  Father history of heart disease at age 29    Have you ever done Advance Care Planning? (For example, a Health Directive, POLST, or a discussion with a medical provider or your loved ones about your wishes): No, advance care planning information given to patient to review.  Patient declined advance care planning discussion at this time.    Social History     Tobacco Use    Smoking status: Never     Passive exposure: Never    Smokeless tobacco: Never   Substance Use Topics    Alcohol use: Yes     Comment: 1  "drink/week         1/5/2024     2:22 PM   Alcohol Use   Prescreen: >3 drinks/day or >7 drinks/week? No     Last PSA: No results found for: \"PSA\"    Reviewed orders with patient. Reviewed health maintenance and updated orders accordingly - Yes  Lab work is in process    Reviewed and updated as needed this visit by clinical staff   Tobacco  Allergies  Meds  Problems  Med Hx  Surg Hx  Fam Hx          Reviewed and updated as needed this visit by Provider                 Past Medical History:   Diagnosis Date    HTN, goal below 140/90 11/13/2018      Past Surgical History:   Procedure Laterality Date    ABDOMEN SURGERY      GI SURGERY      SURGICAL HISTORY OF -       inguinal hernia - left    SURGICAL HISTORY OF -       wisdom teeth       Review of Systems   Constitutional:  Negative for chills and fever.   HENT:  Negative for congestion, ear pain, hearing loss and sore throat.    Eyes:  Negative for pain and visual disturbance.   Respiratory:  Negative for cough and shortness of breath.    Cardiovascular:  Negative for chest pain, palpitations and peripheral edema.   Gastrointestinal:  Negative for abdominal pain, constipation, diarrhea, heartburn, hematochezia and nausea.   Genitourinary:  Negative for dysuria, frequency, genital sores, hematuria, impotence, penile discharge and urgency.   Musculoskeletal:  Negative for arthralgias, joint swelling and myalgias.   Skin:  Negative for rash.   Neurological:  Negative for dizziness, weakness, headaches and paresthesias.   Psychiatric/Behavioral:  Negative for mood changes. The patient is not nervous/anxious.      OBJECTIVE:   /70 (BP Location: Right arm, Patient Position: Chair, Cuff Size: Adult Large)   Pulse 77   Resp 16   Ht 1.803 m (5' 11\")   Wt 99.3 kg (219 lb)   SpO2 97%   BMI 30.54 kg/m      Physical Exam  Constitutional:       Appearance: Normal appearance.   Cardiovascular:      Rate and Rhythm: Normal rate and regular rhythm.   Neurological:    "   Mental Status: He is alert.   Psychiatric:         Thought Content: Thought content normal.         Judgment: Judgment normal.       ASSESSMENT/PLAN:   Mike was seen today for physical.    Diagnoses and all orders for this visit:    Routine general medical examination at a health care facility    Acute gouty arthritis  Never did increase to 300mg, kept at 200mg not having flares  -     Uric acid; Future  -     allopurinol (ZYLOPRIM) 100 MG tablet; Take 2 tablets (200 mg) by mouth daily for 360 days    HTN, goal below 140/80  Well controlled on low dose, encouraged lifestyle and dietary changes  -     lisinopril (ZESTRIL) 5 MG tablet; Take 1 tablet (5 mg) by mouth daily  -     Basic metabolic panel  (Ca, Cl, CO2, Creat, Gluc, K, Na, BUN); Future    High blood triglycerides  Due for recheck  -     fenofibrate (LOFIBRA) 54 MG tablet; Take 1 tablet (54 mg) by mouth daily  -     atorvastatin (LIPITOR) 20 MG tablet; Take 1 tablet (20 mg) by mouth daily  -     Lipid panel reflex to direct LDL Fasting; Future  -     CT Coronary Calcium Scan; Future    FAMILY HISTORY OF CARDIOVASC DIS (ISCHEMIC)  Significant family hx of CAD-father in his late 20s  -     CT Coronary Calcium Scan; Future     Family hx of colorectal cancer  Recommend to start colonoscopy age 45. Mother diagnosed late 60s.       Other orders  -     PRIMARY CARE FOLLOW-UP SCHEDULING; Future        Patient has been advised of split billing requirements and indicates understanding: Yes      COUNSELING:   Reviewed preventive health counseling, as reflected in patient instructions  Special attention given to:        Regular exercise       Healthy diet/nutrition       Vision screening       Colorectal cancer screening        He reports that he has never smoked. He has never been exposed to tobacco smoke. He has never used smokeless tobacco.          NORA MIX DO  [facility]

## 2024-01-05 NOTE — PATIENT INSTRUCTIONS
Patient is to contact [facility]  at [phone], to schedule this appointment.  Preventive Health Recommendations  Male Ages 40 to 49    Yearly exam:             See your health care provider every year in order to  o   Review health changes.   o   Discuss preventive care.    o   Review your medicines if your doctor has prescribed any.  You should be tested each year for STDs (sexually transmitted diseases) if you re at risk.   Have a cholesterol test every 5 years.   Have a colonoscopy (test for colon cancer) beginning at age 45.  Ask your provider about other options like a yearly FIT test or Cologuard test every 3 years (stool tests)   After age 45, have a diabetes test (fasting glucose). If you are at risk for diabetes, you should have this test every 3 years.    Talk with your health care provider about whether or not a prostate cancer screening test (PSA) is right for you.    Shots: Get a flu shot each year. Get a tetanus shot every 10 years.     Nutrition:  Eat at least 5 servings of fruits and vegetables daily.   Eat whole-grain bread, whole-wheat pasta and brown rice instead of white grains and rice.   Get adequate Calcium and Vitamin D.     Lifestyle  Exercise for at least 150 minutes a week (30 minutes a day, 5 days a week). This will help you control your weight and prevent disease.   Limit alcohol to one drink per day.   No smoking.   Wear sunscreen to prevent skin cancer.   See your dentist every six months for an exam and cleaning.

## 2024-01-06 LAB — LDLC SERPL DIRECT ASSAY-MCNC: 102 MG/DL

## 2024-01-10 RX ORDER — ICOSAPENT ETHYL 1 G/1
2 CAPSULE ORAL 2 TIMES DAILY WITH MEALS
Qty: 360 CAPSULE | Refills: 3 | Status: SHIPPED | OUTPATIENT
Start: 2024-01-10 | End: 2025-01-04

## 2024-01-10 RX ORDER — ALLOPURINOL 300 MG/1
300 TABLET ORAL DAILY
Qty: 90 TABLET | Refills: 1 | Status: SHIPPED | OUTPATIENT
Start: 2024-01-10 | End: 2024-07-24

## 2024-01-23 ENCOUNTER — TELEPHONE (OUTPATIENT)
Dept: FAMILY MEDICINE | Facility: CLINIC | Age: 44
End: 2024-01-23
Payer: COMMERCIAL

## 2024-01-23 DIAGNOSIS — Z82.49 FAMILY HISTORY OF ISCHEMIC HEART DISEASE: ICD-10-CM

## 2024-01-23 DIAGNOSIS — E78.1 HIGH BLOOD TRIGLYCERIDES: ICD-10-CM

## 2024-01-23 RX ORDER — ICOSAPENT ETHYL 1 G/1
2 CAPSULE ORAL 2 TIMES DAILY WITH MEALS
Qty: 360 CAPSULE | Refills: 3 | Status: CANCELLED | OUTPATIENT
Start: 2024-01-23

## 2024-01-23 NOTE — TELEPHONE ENCOUNTER
Prior Authorization Retail Medication Request    Medication/Dose: Vasepa 1gm caps  Diagnosis and ICD code (if different than what is on RX):  na  New/renewal/insurance change PA/secondary ins. PA:  Previously Tried and Failed:  nda  Rationale:  na    Insurance   Primary: [employer] commercial  Insurance ID:  [id number]    Secondary (if applicable):na  Insurance ID:  na    Pharmacy Information (if different than what is on RX)  Name:  [facility]  Phone:  [phone]  Fax:[phone]

## 2024-02-02 NOTE — TELEPHONE ENCOUNTER
Prior Authorization Approval    Authorization Effective Date: 1/3/2024  Authorization Expiration Date: 1/31/2029  Medication: Vasepa 1gm caps  Approved Dose/Quantity:   Reference #:     Insurance Company: [employer] - Phone [phone] Fax [phone]  Expected CoPay:       CoPay Card Available:      Foundation Assistance Needed:    Which Pharmacy is filling the prescription (Not needed for infusion/clinic administered): [facility] [place], [place] - [address]  Pharmacy Notified:  yes  Patient Notified:  yes- Pharmacy will contact patient when ready to /ship

## 2024-02-02 NOTE — TELEPHONE ENCOUNTER
Central Prior Authorization Team   Phone: [phone]    PA Initiation    Medication: Vasepa 1gm caps  Insurance Company: [employer] - Phone [phone] Fax [phone]  Pharmacy Filling the Rx: [place], [place] - [address]  Filling Pharmacy Phone: [phone]  Filling Pharmacy Fax:    Start Date: 2/2/2024

## 2024-07-23 DIAGNOSIS — M10.9 ACUTE GOUTY ARTHRITIS: ICD-10-CM

## 2024-07-23 RX ORDER — ALLOPURINOL 300 MG/1
1 TABLET ORAL DAILY
Qty: 90 TABLET | Refills: 1 | OUTPATIENT
Start: 2024-07-23

## 2024-07-23 NOTE — TELEPHONE ENCOUNTER
Requested Prescriptions   Pending Prescriptions Disp Refills    allopurinol (ZYLOPRIM) 300 MG tablet [Pharmacy Med Name: ALLOPURINOL 300MG TABS] 90 tablet 1     Sig: TAKE 1 TABLET BY MOUTH ONCE DAILY       Gout Agents Protocol Failed - 7/23/2024  8:33 AM        Failed - CBC on file in past 12 months     No lab results found.              Failed - ALT on file in past 12 months     No lab results found.          Failed - Has Uric Acid on file in past 12 months and value is less than 6     Recent Labs   Lab Test 01/05/24  1533   URIC 7.4*     If level is 6mg/dL or greater, ok to refill one time and refer to provider.           Passed - Medication is active on med list        Passed - Medication indicated for associated diagnosis     One of the following medications: colchicine is prescribed for one or more of the following conditions:     Amyloidosis   ulcer of mouth   Bechet's syndrome   Pericarditis   Peyronie's disease, psoriasis          Passed - Has GFR on file in past 12 months and most recent value is normal        Passed - Recent (12 mo) or future (90 days) visit within the authorizing provider's specialty     The patient must have completed an in-person or virtual visit within the past 12 months or has a future visit scheduled within the next 90 days with the authorizing provider s specialty.  Urgent care and e-visits do not quality as an office visit for this protocol.          Passed - Patient is age 18 or older     Refill protocol is for patient's aged 18 years and older

## 2024-07-23 NOTE — TELEPHONE ENCOUNTER
Covering for provider.   Please send prescription renewal to Dr. Felicitas Naranjo who patient last saw for annual visit on 1/5/2024.

## 2024-07-24 RX ORDER — ALLOPURINOL 300 MG/1
300 TABLET ORAL DAILY
Qty: 90 TABLET | Refills: 0 | Status: SHIPPED | OUTPATIENT
Start: 2024-07-24

## 2024-10-21 DIAGNOSIS — M10.9 ACUTE GOUTY ARTHRITIS: ICD-10-CM

## 2024-10-22 RX ORDER — ALLOPURINOL 300 MG/1
1 TABLET ORAL DAILY
Qty: 90 TABLET | Refills: 0 | Status: SHIPPED | OUTPATIENT
Start: 2024-10-22

## 2024-10-22 NOTE — TELEPHONE ENCOUNTER
Requested Prescriptions   Pending Prescriptions Disp Refills    allopurinol (ZYLOPRIM) 300 MG tablet [Pharmacy Med Name: ALLOPURINOL 300MG TABS] 90 tablet 0     Sig: TAKE ONE TABLET BY MOUTH ONCE DAILY       Gout Agents Protocol Failed - 10/21/2024  3:03 PM        Failed - CBC on file in past 12 months     No lab results found.              Failed - ALT on file in past 12 months     No lab results found.          Failed - Has Uric Acid on file in past 12 months and value is less than 6     Recent Labs   Lab Test 01/05/24  1533   URIC 7.4*     If level is 6mg/dL or greater, ok to refill one time and refer to provider.           Passed - Medication is active on med list        Passed - Medication indicated for associated diagnosis     One of the following medications: colchicine is prescribed for one or more of the following conditions:     Amyloidosis   ulcer of mouth   Bechet's syndrome   Pericarditis   Peyronie's disease, psoriasis          Passed - Has GFR on file in past 12 months and most recent value is normal        Passed - Recent (12 mo) or future (90 days) visit within the authorizing provider's specialty     The patient must have completed an in-person or virtual visit within the past 12 months or has a future visit scheduled within the next 90 days with the authorizing provider s specialty.  Urgent care and e-visits do not quality as an office visit for this protocol.          Passed - Patient is age 18 or older     Refill protocol is for patient's aged 18 years and older

## 2024-10-22 NOTE — TELEPHONE ENCOUNTER
Spoke with patient. Patient in good understanding. He will call back to schedule.    Dot Haely on 10/22/2024 at 1:21 PM

## 2025-03-01 ENCOUNTER — HEALTH MAINTENANCE LETTER (OUTPATIENT)
Age: 45
End: 2025-03-01

## 2025-03-24 ENCOUNTER — LAB (OUTPATIENT)
Dept: LAB | Facility: CLINIC | Age: 45
End: 2025-03-24
Payer: COMMERCIAL

## 2025-03-24 DIAGNOSIS — M1A.09X0 IDIOPATHIC CHRONIC GOUT OF MULTIPLE SITES WITHOUT TOPHUS: ICD-10-CM

## 2025-03-24 DIAGNOSIS — I10 HTN, GOAL BELOW 140/90: ICD-10-CM

## 2025-03-24 DIAGNOSIS — E78.1 HIGH BLOOD TRIGLYCERIDES: ICD-10-CM

## 2025-03-24 LAB
ANION GAP SERPL CALCULATED.3IONS-SCNC: 13 MMOL/L (ref 7–15)
BUN SERPL-MCNC: 15.9 MG/DL (ref 6–20)
CALCIUM SERPL-MCNC: 10.2 MG/DL (ref 8.8–10.4)
CHLORIDE SERPL-SCNC: 103 MMOL/L (ref 98–107)
CHOLEST SERPL-MCNC: 186 MG/DL
CREAT SERPL-MCNC: 1.17 MG/DL (ref 0.67–1.17)
EGFRCR SERPLBLD CKD-EPI 2021: 79 ML/MIN/1.73M2
FASTING STATUS PATIENT QL REPORTED: YES
FASTING STATUS PATIENT QL REPORTED: YES
GLUCOSE SERPL-MCNC: 81 MG/DL (ref 70–99)
HCO3 SERPL-SCNC: 27 MMOL/L (ref 22–29)
HDLC SERPL-MCNC: 33 MG/DL
LDLC SERPL CALC-MCNC: ABNORMAL MG/DL
NONHDLC SERPL-MCNC: 153 MG/DL
POTASSIUM SERPL-SCNC: 3.8 MMOL/L (ref 3.4–5.3)
SODIUM SERPL-SCNC: 143 MMOL/L (ref 135–145)
TRIGL SERPL-MCNC: 409 MG/DL
URATE SERPL-MCNC: 6.5 MG/DL (ref 3.4–7)

## 2025-03-24 PROCEDURE — 36415 COLL VENOUS BLD VENIPUNCTURE: CPT

## 2025-03-24 PROCEDURE — 80048 BASIC METABOLIC PNL TOTAL CA: CPT

## 2025-03-24 PROCEDURE — 80061 LIPID PANEL: CPT

## 2025-03-24 PROCEDURE — 84550 ASSAY OF BLOOD/URIC ACID: CPT

## 2025-03-24 PROCEDURE — 83721 ASSAY OF BLOOD LIPOPROTEIN: CPT | Mod: 59

## 2025-03-25 DIAGNOSIS — M1A.09X0 IDIOPATHIC CHRONIC GOUT OF MULTIPLE SITES WITHOUT TOPHUS: ICD-10-CM

## 2025-03-25 LAB — LDLC SERPL DIRECT ASSAY-MCNC: 85 MG/DL

## 2025-03-25 RX ORDER — ALLOPURINOL 100 MG/1
100 TABLET ORAL DAILY
Qty: 90 TABLET | Refills: 3 | Status: SHIPPED | OUTPATIENT
Start: 2025-03-25

## 2025-04-01 ENCOUNTER — PATIENT OUTREACH (OUTPATIENT)
Dept: CARE COORDINATION | Facility: CLINIC | Age: 45
End: 2025-04-01
Payer: COMMERCIAL

## 2025-04-15 ENCOUNTER — HOSPITAL ENCOUNTER (OUTPATIENT)
Dept: CT IMAGING | Facility: CLINIC | Age: 45
Discharge: HOME | End: 2025-04-15
Attending: FAMILY MEDICINE | Admitting: FAMILY MEDICINE
Payer: COMMERCIAL

## 2025-04-15 DIAGNOSIS — Z13.6 ISCHEMIC HEART DISEASE SCREEN: ICD-10-CM

## 2025-04-15 DIAGNOSIS — Z82.49 FAMILY HISTORY OF ISCHEMIC HEART DISEASE: ICD-10-CM

## 2025-04-15 PROCEDURE — 75571 CT HRT W/O DYE W/CA TEST: CPT

## 2025-04-16 PROBLEM — K76.0 HEPATIC STEATOSIS: Status: ACTIVE | Noted: 2025-04-16

## 2025-05-15 ENCOUNTER — PATIENT OUTREACH (OUTPATIENT)
Dept: CARE COORDINATION | Facility: CLINIC | Age: 45
End: 2025-05-15
Payer: COMMERCIAL